# Patient Record
Sex: FEMALE | Race: WHITE | Employment: FULL TIME | URBAN - METROPOLITAN AREA
[De-identification: names, ages, dates, MRNs, and addresses within clinical notes are randomized per-mention and may not be internally consistent; named-entity substitution may affect disease eponyms.]

---

## 2017-07-08 ENCOUNTER — HOSPITAL ENCOUNTER (OUTPATIENT)
Dept: MAMMOGRAPHY | Age: 37
Discharge: HOME | End: 2017-07-08
Attending: OBSTETRICS & GYNECOLOGY | Admitting: OBSTETRICS & GYNECOLOGY

## 2017-07-08 DIAGNOSIS — Z12.31 ENCOUNTER FOR SCREENING MAMMOGRAM FOR BREAST CANCER: ICD-10-CM

## 2018-01-23 ENCOUNTER — HOSPITAL ENCOUNTER (OUTPATIENT)
Dept: OTHER | Age: 38
Discharge: HOME | End: 2018-01-23
Attending: NURSE PRACTITIONER | Admitting: NURSE PRACTITIONER

## 2018-01-23 DIAGNOSIS — R06.09 DYSPNEA ON EXERTION: ICD-10-CM

## 2018-01-23 LAB
AMYLASE: 40 U/L (ref 25–115)
BASOPHILS ABSOLUTE: 0 K/UL (ref 0–0.2)
BASOPHILS RELATIVE PERCENT: 0.7 %
D DIMER: 212 NG/ML DDU (ref 0–229)
EOSINOPHILS ABSOLUTE: 0 K/UL (ref 0–0.6)
EOSINOPHILS RELATIVE PERCENT: 0.8 %
HCT VFR BLD CALC: 39.7 % (ref 36–48)
HEMOGLOBIN: 13.6 G/DL (ref 12–16)
LIPASE: 28 U/L (ref 13–60)
LYMPHOCYTES ABSOLUTE: 1.5 K/UL (ref 1–5.1)
LYMPHOCYTES RELATIVE PERCENT: 26.8 %
MCH RBC QN AUTO: 29.5 PG (ref 26–34)
MCHC RBC AUTO-ENTMCNC: 34.2 G/DL (ref 31–36)
MCV RBC AUTO: 86.3 FL (ref 80–100)
MONOCYTES ABSOLUTE: 0.3 K/UL (ref 0–1.3)
MONOCYTES RELATIVE PERCENT: 6 %
NEUTROPHILS ABSOLUTE: 3.6 K/UL (ref 1.7–7.7)
NEUTROPHILS RELATIVE PERCENT: 65.7 %
PDW BLD-RTO: 13.5 % (ref 12.4–15.4)
PLATELET # BLD: 152 K/UL (ref 135–450)
PMV BLD AUTO: 9.8 FL (ref 5–10.5)
RBC # BLD: 4.6 M/UL (ref 4–5.2)
WBC # BLD: 5.4 K/UL (ref 4–11)

## 2018-07-09 ENCOUNTER — HOSPITAL ENCOUNTER (OUTPATIENT)
Dept: MAMMOGRAPHY | Age: 38
Discharge: HOME | End: 2018-07-09
Attending: OBSTETRICS & GYNECOLOGY | Admitting: OBSTETRICS & GYNECOLOGY

## 2018-07-09 DIAGNOSIS — Z12.31 SCREENING MAMMOGRAM, ENCOUNTER FOR: ICD-10-CM

## 2019-07-15 ENCOUNTER — HOSPITAL ENCOUNTER (OUTPATIENT)
Dept: MAMMOGRAPHY | Age: 39
Discharge: HOME | End: 2019-07-15
Payer: COMMERCIAL

## 2019-07-15 DIAGNOSIS — Z12.31 ENCOUNTER FOR SCREENING MAMMOGRAM FOR BREAST CANCER: ICD-10-CM

## 2019-07-15 PROCEDURE — 77063 BREAST TOMOSYNTHESIS BI: CPT

## 2020-07-16 ENCOUNTER — TELEPHONE (OUTPATIENT)
Dept: MAMMOGRAPHY | Age: 40
End: 2020-07-16

## 2020-07-16 ENCOUNTER — HOSPITAL ENCOUNTER (OUTPATIENT)
Dept: MAMMOGRAPHY | Age: 40
Discharge: HOME | End: 2020-07-16
Payer: COMMERCIAL

## 2020-07-16 PROCEDURE — 77063 BREAST TOMOSYNTHESIS BI: CPT

## 2020-12-11 ENCOUNTER — APPOINTMENT (OUTPATIENT)
Dept: GENERAL RADIOLOGY | Age: 40
End: 2020-12-11
Payer: COMMERCIAL

## 2020-12-11 ENCOUNTER — HOSPITAL ENCOUNTER (EMERGENCY)
Age: 40
Discharge: HOME | End: 2020-12-11
Attending: STUDENT IN AN ORGANIZED HEALTH CARE EDUCATION/TRAINING PROGRAM
Payer: COMMERCIAL

## 2020-12-11 VITALS
DIASTOLIC BLOOD PRESSURE: 84 MMHG | BODY MASS INDEX: 26.78 KG/M2 | SYSTOLIC BLOOD PRESSURE: 140 MMHG | TEMPERATURE: 97.8 F | HEART RATE: 54 BPM | WEIGHT: 156 LBS | RESPIRATION RATE: 18 BRPM | OXYGEN SATURATION: 100 %

## 2020-12-11 LAB
A/G RATIO: 1.8 (ref 1.1–2.2)
ALBUMIN SERPL-MCNC: 4.8 G/DL (ref 3.4–5)
ALP BLD-CCNC: 78 U/L (ref 40–129)
ALT SERPL-CCNC: 24 U/L (ref 10–40)
ANION GAP SERPL CALCULATED.3IONS-SCNC: 10 MMOL/L (ref 3–16)
AST SERPL-CCNC: 17 U/L (ref 15–37)
BASOPHILS ABSOLUTE: 0 K/UL (ref 0–0.2)
BASOPHILS RELATIVE PERCENT: 0.4 %
BILIRUB SERPL-MCNC: 0.4 MG/DL (ref 0–1)
BUN BLDV-MCNC: 11 MG/DL (ref 7–20)
CALCIUM SERPL-MCNC: 9.7 MG/DL (ref 8.3–10.6)
CHLORIDE BLD-SCNC: 104 MMOL/L (ref 99–110)
CO2: 26 MMOL/L (ref 21–32)
CREAT SERPL-MCNC: 0.7 MG/DL (ref 0.6–1.1)
D DIMER: 228 NG/ML DDU (ref 0–229)
EKG ATRIAL RATE: 52 BPM
EKG DIAGNOSIS: NORMAL
EKG P AXIS: 60 DEGREES
EKG P-R INTERVAL: 148 MS
EKG Q-T INTERVAL: 468 MS
EKG QRS DURATION: 98 MS
EKG QTC CALCULATION (BAZETT): 435 MS
EKG R AXIS: 73 DEGREES
EKG T AXIS: 68 DEGREES
EKG VENTRICULAR RATE: 52 BPM
EOSINOPHILS ABSOLUTE: 0.1 K/UL (ref 0–0.6)
EOSINOPHILS RELATIVE PERCENT: 1.5 %
GFR AFRICAN AMERICAN: >60
GFR NON-AFRICAN AMERICAN: >60
GLOBULIN: 2.7 G/DL
GLUCOSE BLD-MCNC: 100 MG/DL (ref 70–99)
HCT VFR BLD CALC: 42.1 % (ref 36–48)
HEMOGLOBIN: 14.4 G/DL (ref 12–16)
LYMPHOCYTES ABSOLUTE: 1.5 K/UL (ref 1–5.1)
LYMPHOCYTES RELATIVE PERCENT: 27.5 %
MCH RBC QN AUTO: 29.3 PG (ref 26–34)
MCHC RBC AUTO-ENTMCNC: 34.2 G/DL (ref 31–36)
MCV RBC AUTO: 85.5 FL (ref 80–100)
MONOCYTES ABSOLUTE: 0.5 K/UL (ref 0–1.3)
MONOCYTES RELATIVE PERCENT: 9 %
NEUTROPHILS ABSOLUTE: 3.4 K/UL (ref 1.7–7.7)
NEUTROPHILS RELATIVE PERCENT: 61.6 %
PDW BLD-RTO: 13 % (ref 12.4–15.4)
PLATELET # BLD: 199 K/UL (ref 135–450)
PMV BLD AUTO: 9.3 FL (ref 5–10.5)
POTASSIUM REFLEX MAGNESIUM: 3.8 MMOL/L (ref 3.5–5.1)
RBC # BLD: 4.92 M/UL (ref 4–5.2)
SODIUM BLD-SCNC: 140 MMOL/L (ref 136–145)
TOTAL PROTEIN: 7.5 G/DL (ref 6.4–8.2)
TROPONIN: <0.01 NG/ML
WBC # BLD: 5.5 K/UL (ref 4–11)

## 2020-12-11 PROCEDURE — 84484 ASSAY OF TROPONIN QUANT: CPT

## 2020-12-11 PROCEDURE — 93010 ELECTROCARDIOGRAM REPORT: CPT | Performed by: INTERNAL MEDICINE

## 2020-12-11 PROCEDURE — 71045 X-RAY EXAM CHEST 1 VIEW: CPT

## 2020-12-11 PROCEDURE — 36415 COLL VENOUS BLD VENIPUNCTURE: CPT

## 2020-12-11 PROCEDURE — 2580000003 HC RX 258: Performed by: STUDENT IN AN ORGANIZED HEALTH CARE EDUCATION/TRAINING PROGRAM

## 2020-12-11 PROCEDURE — 80053 COMPREHEN METABOLIC PANEL: CPT

## 2020-12-11 PROCEDURE — 85025 COMPLETE CBC W/AUTO DIFF WBC: CPT

## 2020-12-11 PROCEDURE — 99285 EMERGENCY DEPT VISIT HI MDM: CPT

## 2020-12-11 PROCEDURE — 93005 ELECTROCARDIOGRAM TRACING: CPT | Performed by: STUDENT IN AN ORGANIZED HEALTH CARE EDUCATION/TRAINING PROGRAM

## 2020-12-11 PROCEDURE — 85379 FIBRIN DEGRADATION QUANT: CPT

## 2020-12-11 RX ORDER — 0.9 % SODIUM CHLORIDE 0.9 %
1000 INTRAVENOUS SOLUTION INTRAVENOUS ONCE
Status: COMPLETED | OUTPATIENT
Start: 2020-12-11 | End: 2020-12-11

## 2020-12-11 RX ADMIN — SODIUM CHLORIDE 1000 ML: 9 INJECTION, SOLUTION INTRAVENOUS at 16:08

## 2020-12-11 ASSESSMENT — ENCOUNTER SYMPTOMS
NAUSEA: 0
PHOTOPHOBIA: 0
STRIDOR: 0
COUGH: 1
VOMITING: 0
ABDOMINAL PAIN: 0
BACK PAIN: 0
CHEST TIGHTNESS: 1
SORE THROAT: 0
RHINORRHEA: 0
SHORTNESS OF BREATH: 1

## 2020-12-11 ASSESSMENT — PAIN DESCRIPTION - DESCRIPTORS: DESCRIPTORS: ACHING

## 2020-12-11 ASSESSMENT — PAIN SCALES - GENERAL: PAINLEVEL_OUTOF10: 2

## 2020-12-11 ASSESSMENT — PAIN DESCRIPTION - LOCATION: LOCATION: CHEST

## 2020-12-11 NOTE — ED NOTES
Pt states she felt she was improving. States she was dx with covid last week. States some chest pain and discomfort with deep breathing.       Margaret Saavedra RN  12/11/20 [id number]

## 2020-12-11 NOTE — ED PROVIDER NOTES
[facility]      Pt Name: Segundo Patel  MRN: [id number]  [place] 1980  Date of evaluation: 12/11/2020  Provider: Flip Mcdonald, [address] [place] [address]       Chief Complaint   Patient presents with    Dizziness     covid+ , sob dizzy today          HISTORY OF PRESENT ILLNESS   (Location/Symptom, Timing/Onset, Context/Setting, Quality, Duration, Modifying Factors, Severity)  Note limiting factors. Segundo Patel is a 41 y.o. female who presents to the emergency department complaining of shortness of breath, dizziness, chest tightness. Patient known Covid infection, has been symptomatic for roughly 1-1/2 weeks. Patient called EMS to home today due to feeling as if she was going to pass out describing lightheadedness. Does complain of some left-sided chest tightness which does not radiate. Denies cardiac history. Denies lower extremity asymmetry or posterior calf pain. Of note patient does have prior history of DVT 15 years ago following a pregnancy. Not currently on anticoagulation aside from 81 mg aspirin daily. Patient is not tachycardic or tachypneic on arrival, patient is satting 99% on room air on arrival by EMS. This patient was evaluated in the Emergency Department for symptoms described in the history of present illness. He/she was evaluated in the context of the global COVID-19 pandemic, which necessitated consideration that the patient might be at risk for infection with the SARS-CoV-2 virus that causes COVID-19. Institutional protocols and algorithms that pertain to the evaluation of patients at risk for COVID-19 are in a state of rapid change based on information released by regulatory bodies including the CDC and federal and state organizations. These policies and algorithms were followed during the patient's care in the ED.     Patient was seen and evaluated with N95, eye protection, gloves        Nursing Notes were reviewed. PAST MEDICAL HISTORY     Past Medical History:   Diagnosis Date    DVT (deep venous thrombosis) ([facility] [address] 2005    RLE (2 concurrent clots); post-partum; was on Lovenox for 3 mo.  History of blood clots          SURGICAL HISTORY       Past Surgical History:   Procedure Laterality Date    NASAL ENDOSCOPY  12/23/13    nasal endoscopy, removal of foreign body    TONSILLECTOMY      WRIST SURGERY  1995    plate put in         CURRENT MEDICATIONS       Previous Medications    ASPIRIN 81 MG CHEWABLE TABLET    Take 81 mg by mouth daily. IBUPROFEN (IBU) 600 MG TABLET    Take 1 tablet by mouth every 6 hours as needed for Pain. ALLERGIES     Patient has no known allergies.     FAMILY HISTORY       Family History   Problem Relation Age of Onset    Cancer Maternal Grandmother [de-identified]        lung CA spread to breast    Heart Disease Maternal Grandfather 58        CHF    Cancer Paternal Grandfather         prostate    Alzheimer's Disease Paternal Grandfather     Mult Sclerosis Mother     Heart Disease Paternal Grandmother           SOCIAL HISTORY       Social History     Socioeconomic History    Marital status:      Spouse name: None    Number of children: None    Years of education: None    Highest education level: None   Occupational History    None   Social Needs    Financial resource strain: None    Food insecurity     Worry: None     Inability: None    Transportation needs     Medical: None     Non-medical: None   Tobacco Use    Smoking status: Never Smoker    Smokeless tobacco: Never Used   Substance and Sexual Activity    Alcohol use: No    Drug use: No    Sexual activity: Yes     Partners: Male   Lifestyle    Physical activity     Days per week: None     Minutes per session: None    Stress: None   Relationships    Social connections     Talks on phone: None     Gets together: None     Attends Caodaism service: None     Active member of club or organization: None     Attends meetings of clubs or organizations: None     Relationship status: None    Intimate partner violence     Fear of current or ex partner: None     Emotionally abused: None     Physically abused: None     Forced sexual activity: None   Other Topics Concern    None   Social History Narrative    None       SCREENINGS        Tamiko Coma Scale  Eye Opening: Spontaneous  Best Verbal Response: Oriented  Best Motor Response: Obeys commands  Tamiko Coma Scale Score: 15                   REVIEW OF SYSTEMS    (2-9 systems for level 4, 10 or more for level 5)   Review of Systems   Constitutional: Positive for chills, fatigue and fever. HENT: Negative for congestion, rhinorrhea and sore throat. Eyes: Negative for photophobia and visual disturbance. Respiratory: Positive for cough, chest tightness and shortness of breath. Negative for stridor. Cardiovascular: Positive for chest pain. Negative for palpitations and leg swelling. Gastrointestinal: Negative for abdominal pain, nausea and vomiting. Genitourinary: Negative for decreased urine volume. Musculoskeletal: Positive for myalgias. Negative for back pain, neck pain and neck stiffness. Skin: Negative for rash. Allergic/Immunologic: Negative for environmental allergies. Hematological: Negative for adenopathy. Psychiatric/Behavioral: Negative for confusion. PHYSICAL EXAM    (up to 7 for level 4, 8 or more for level 5)     ED Triage Vitals   BP Temp Temp src Pulse Resp SpO2 Height Weight   -- -- -- -- -- -- -- --       Physical Exam  Constitutional:       General: She is not in acute distress. Appearance: She is not diaphoretic. HENT:      Head: Normocephalic and atraumatic. Eyes:      Pupils: Pupils are equal, round, and reactive to light. Neck:      Musculoskeletal: Normal range of motion and neck supple. Trachea: No tracheal deviation. Cardiovascular:      Rate and Rhythm: Regular rhythm. Bradycardia present.    Pulmonary: Effort: Pulmonary effort is normal. No respiratory distress. Abdominal:      General: There is no distension. Palpations: Abdomen is soft. Musculoskeletal: Normal range of motion. Right lower leg: No edema. Left lower leg: No edema. Skin:     General: Skin is warm. Neurological:      Mental Status: She is oriented to person, place, and time. DIAGNOSTIC RESULTS     EKG: All EKG's are interpreted by the Emergency Department Physician who either signs or Co-signs this chart in the absence of a cardiologist.      The Ekg interpreted by me shows  normal sinus rhythm with a rate of 52  Axis is   Normal  QTc is  normal  Intervals and Durations are unremarkable. ST Segments: nonspecific changes    No significant change from prior EKG dated none        RADIOLOGY:   Non-plain film images such as CT, Ultrasound and MRI are read by the radiologist. Plain radiographic images are visualized and preliminarily interpreted by the emergency physician. Interpretation per the Radiologist below, if available at the time of this note:    XR CHEST PORTABLE   Final Result   No evidence of acute cardiopulmonary disease. LABS:  Labs Reviewed   COMPREHENSIVE METABOLIC PANEL W/ REFLEX TO MG FOR LOW K - Abnormal; Notable for the following components:       Result Value    Glucose 100 (*)     All other components within normal limits    Narrative:     Performed at:  [facility]. [facility]  [address] [place] [address]. [place] [address] [place]    Phone [phone]   CBC WITH AUTO DIFFERENTIAL    Narrative:     Performed at:  [facility]. [facility]  [address] [place] [address]. [place] [address] [place]    Phone [phone]   TROPONIN    Narrative:     Performed at:  [facility]. [facility]  [address] [place] [address].  [address]   Phone [phone]   D-DIMER, QUANTITATIVE    Narrative:     Performed at:  [id number] [facility] [address] [facility]  [address] [place], [place]. [facility], [address]   Phone [phone]       All other labs were within normal range or not returned as of this dictation. EMERGENCY DEPARTMENT COURSE and DIFFERENTIAL DIAGNOSIS/MDM:   Jamarcus Bustamante is a 41 y.o. female who presents to the emergency department with the complaint of fatigue, lightheadedness dizziness, near syncope, chest tightness. History of Covid symptomatic for last 1/2 weeks. On arrival patient's vital signs are stable she is not tachycardic tachypneic or hypoxic with SPO2 of 90% on room air. Lung sounds are clear bilaterally abdomen soft nontender. Heart slightly bradycardic but otherwise regular without murmurs rubs or gallops. History of DVT 15 years ago, clinically no signs of DVT on exam.  Due to patient's shortness of breath complaint, near syncope complaining with chest tightness in the setting of Covid infection, which puts her at high risk for clotting, obtain D-dimer, basic labs, troponin, EKG, portable chest x-ray. Patient work-up reviewed prior to discharge patient's troponin less than 0.01, D-dimer low, no signs of ischemia on EKG. Chest x-ray without signs of bacterial pneumonia. Otherwise patient's lab work is stable. Patient has known Covid infection likely explaining her symptoms. Will discharge home. Patient was given both written and verbal return precautions for coronavirus infection including monitoring of SPO2, patient instructed to return to ER if SPO2 remains below 90 at rest.             CRITICAL CARE TIME   Total Critical Care time was 0 minutes, excluding separately reportable procedures. There was a high probability of clinically significant/life threatening deterioration in the patient's condition which required my urgent intervention.      Clinical concern   Intervention     CONSULTS:  None    PROCEDURES:  Unless otherwise noted below, none     Procedures        FINAL

## 2021-02-03 ENCOUNTER — HOSPITAL ENCOUNTER (OUTPATIENT)
Age: 41
Discharge: HOME | End: 2021-02-03
Payer: COMMERCIAL

## 2021-02-03 ENCOUNTER — HOSPITAL ENCOUNTER (OUTPATIENT)
Dept: GENERAL RADIOLOGY | Age: 41
Discharge: HOME | End: 2021-02-03
Payer: COMMERCIAL

## 2021-02-03 DIAGNOSIS — R06.00 DYSPNEA, UNSPECIFIED TYPE: ICD-10-CM

## 2021-02-03 PROCEDURE — 71046 X-RAY EXAM CHEST 2 VIEWS: CPT

## 2021-07-26 ENCOUNTER — HOSPITAL ENCOUNTER (OUTPATIENT)
Dept: MAMMOGRAPHY | Age: 41
Discharge: HOME | End: 2021-07-26
Payer: COMMERCIAL

## 2021-07-26 DIAGNOSIS — Z12.31 SCREENING MAMMOGRAM, ENCOUNTER FOR: ICD-10-CM

## 2021-07-26 PROCEDURE — 77063 BREAST TOMOSYNTHESIS BI: CPT

## 2021-07-27 ENCOUNTER — TELEPHONE (OUTPATIENT)
Dept: MAMMOGRAPHY | Age: 41
End: 2021-07-27

## 2021-10-14 ENCOUNTER — APPOINTMENT (OUTPATIENT)
Dept: CT IMAGING | Age: 41
End: 2021-10-14
Payer: COMMERCIAL

## 2021-10-14 ENCOUNTER — HOSPITAL ENCOUNTER (EMERGENCY)
Age: 41
Discharge: HOME | End: 2021-10-14
Attending: STUDENT IN AN ORGANIZED HEALTH CARE EDUCATION/TRAINING PROGRAM
Payer: COMMERCIAL

## 2021-10-14 ENCOUNTER — APPOINTMENT (OUTPATIENT)
Dept: GENERAL RADIOLOGY | Age: 41
End: 2021-10-14
Payer: COMMERCIAL

## 2021-10-14 VITALS
HEART RATE: 54 BPM | DIASTOLIC BLOOD PRESSURE: 77 MMHG | SYSTOLIC BLOOD PRESSURE: 113 MMHG | RESPIRATION RATE: 14 BRPM | TEMPERATURE: 97.2 F | OXYGEN SATURATION: 99 %

## 2021-10-14 DIAGNOSIS — R07.89 ATYPICAL CHEST PAIN: Primary | ICD-10-CM

## 2021-10-14 LAB
A/G RATIO: 1.7 (ref 1.1–2.2)
ALBUMIN SERPL-MCNC: 4.4 G/DL (ref 3.4–5)
ALP BLD-CCNC: 70 U/L (ref 40–129)
ALT SERPL-CCNC: 14 U/L (ref 10–40)
ANION GAP SERPL CALCULATED.3IONS-SCNC: 10 MMOL/L (ref 3–16)
AST SERPL-CCNC: 15 U/L (ref 15–37)
BASOPHILS ABSOLUTE: 0 K/UL (ref 0–0.2)
BASOPHILS RELATIVE PERCENT: 0.6 %
BILIRUB SERPL-MCNC: 0.3 MG/DL (ref 0–1)
BUN BLDV-MCNC: 10 MG/DL (ref 7–20)
CALCIUM SERPL-MCNC: 9.4 MG/DL (ref 8.3–10.6)
CHLORIDE BLD-SCNC: 103 MMOL/L (ref 99–110)
CO2: 25 MMOL/L (ref 21–32)
CREAT SERPL-MCNC: 0.7 MG/DL (ref 0.6–1.1)
D DIMER: 260 NG/ML DDU (ref 0–229)
EKG ATRIAL RATE: 51 BPM
EKG DIAGNOSIS: NORMAL
EKG P AXIS: 55 DEGREES
EKG P-R INTERVAL: 146 MS
EKG Q-T INTERVAL: 440 MS
EKG QRS DURATION: 104 MS
EKG QTC CALCULATION (BAZETT): 405 MS
EKG R AXIS: 60 DEGREES
EKG T AXIS: 47 DEGREES
EKG VENTRICULAR RATE: 51 BPM
EOSINOPHILS ABSOLUTE: 0.1 K/UL (ref 0–0.6)
EOSINOPHILS RELATIVE PERCENT: 2.8 %
GFR AFRICAN AMERICAN: >60
GFR NON-AFRICAN AMERICAN: >60
GLOBULIN: 2.6 G/DL
GLUCOSE BLD-MCNC: 94 MG/DL (ref 70–99)
HCT VFR BLD CALC: 38.6 % (ref 36–48)
HEMOGLOBIN: 13.2 G/DL (ref 12–16)
LYMPHOCYTES ABSOLUTE: 2.1 K/UL (ref 1–5.1)
LYMPHOCYTES RELATIVE PERCENT: 42.1 %
MCH RBC QN AUTO: 29.3 PG (ref 26–34)
MCHC RBC AUTO-ENTMCNC: 34.2 G/DL (ref 31–36)
MCV RBC AUTO: 85.5 FL (ref 80–100)
MONOCYTES ABSOLUTE: 0.5 K/UL (ref 0–1.3)
MONOCYTES RELATIVE PERCENT: 9.1 %
NEUTROPHILS ABSOLUTE: 2.3 K/UL (ref 1.7–7.7)
NEUTROPHILS RELATIVE PERCENT: 45.4 %
PDW BLD-RTO: 13.6 % (ref 12.4–15.4)
PLATELET # BLD: 171 K/UL (ref 135–450)
PMV BLD AUTO: 9.3 FL (ref 5–10.5)
POTASSIUM REFLEX MAGNESIUM: 3.9 MMOL/L (ref 3.5–5.1)
RBC # BLD: 4.51 M/UL (ref 4–5.2)
SODIUM BLD-SCNC: 138 MMOL/L (ref 136–145)
TOTAL PROTEIN: 7 G/DL (ref 6.4–8.2)
TROPONIN: <0.01 NG/ML
TROPONIN: <0.01 NG/ML
WBC # BLD: 5 K/UL (ref 4–11)

## 2021-10-14 PROCEDURE — 6370000000 HC RX 637 (ALT 250 FOR IP): Performed by: STUDENT IN AN ORGANIZED HEALTH CARE EDUCATION/TRAINING PROGRAM

## 2021-10-14 PROCEDURE — 85025 COMPLETE CBC W/AUTO DIFF WBC: CPT

## 2021-10-14 PROCEDURE — 93005 ELECTROCARDIOGRAM TRACING: CPT | Performed by: STUDENT IN AN ORGANIZED HEALTH CARE EDUCATION/TRAINING PROGRAM

## 2021-10-14 PROCEDURE — 71260 CT THORAX DX C+: CPT

## 2021-10-14 PROCEDURE — 80053 COMPREHEN METABOLIC PANEL: CPT

## 2021-10-14 PROCEDURE — 84484 ASSAY OF TROPONIN QUANT: CPT

## 2021-10-14 PROCEDURE — 85379 FIBRIN DEGRADATION QUANT: CPT

## 2021-10-14 PROCEDURE — 71046 X-RAY EXAM CHEST 2 VIEWS: CPT

## 2021-10-14 PROCEDURE — 93010 ELECTROCARDIOGRAM REPORT: CPT | Performed by: INTERNAL MEDICINE

## 2021-10-14 PROCEDURE — 99282 EMERGENCY DEPT VISIT SF MDM: CPT

## 2021-10-14 PROCEDURE — 6360000004 HC RX CONTRAST MEDICATION: Performed by: STUDENT IN AN ORGANIZED HEALTH CARE EDUCATION/TRAINING PROGRAM

## 2021-10-14 RX ORDER — ASPIRIN 81 MG/1
324 TABLET, CHEWABLE ORAL ONCE
Status: COMPLETED | OUTPATIENT
Start: 2021-10-14 | End: 2021-10-14

## 2021-10-14 RX ADMIN — IOPAMIDOL 85 ML: 755 INJECTION, SOLUTION INTRAVENOUS at 06:40

## 2021-10-14 RX ADMIN — ASPIRIN 324 MG: 81 TABLET, CHEWABLE ORAL at 04:39

## 2021-10-14 ASSESSMENT — ENCOUNTER SYMPTOMS
PHOTOPHOBIA: 0
BACK PAIN: 0
VOMITING: 0
ABDOMINAL PAIN: 0
COUGH: 0
STRIDOR: 0
NAUSEA: 0
SHORTNESS OF BREATH: 1
RHINORRHEA: 0
SORE THROAT: 0

## 2021-10-14 ASSESSMENT — PAIN DESCRIPTION - PAIN TYPE: TYPE: ACUTE PAIN

## 2021-10-14 ASSESSMENT — PAIN DESCRIPTION - LOCATION: LOCATION: CHEST

## 2021-10-14 ASSESSMENT — PAIN DESCRIPTION - DESCRIPTORS: DESCRIPTORS: SHARP

## 2021-10-14 ASSESSMENT — HEART SCORE: ECG: 1

## 2021-10-14 NOTE — ED NOTES
Pt stable for DC to home with family. Reviewed DC instructions, follow up, meds; pt and family verbalized understanding and pt ambulated off unit without trouble.       Harman Matthews RN  10/14/21 [id number]

## 2021-10-14 NOTE — ED PROVIDER NOTES
[address] ED  EMERGENCY DEPARTMENT ENCOUNTER      Pt Name: Hannah Saxena  MRN: [id number]  Armstrongfaimee 1980  Date of evaluation: 10/14/2021  Provider: Griffin Porras [address] [place] [address]       Chief Complaint   Patient presents with    Chest Pain     Patient was awoken from sleep with sharp chest pain, states it is hard for her to catch her breath. History of DVTs         HISTORY OF PRESENT ILLNESS   (Location/Symptom, Timing/Onset, Context/Setting, Quality, Duration, Modifying Factors, Severity)  Note limiting factors. Hannah Saxena is a 35 y.o. female who presents to the emergency department complaining of chest pain, right-sided just lateral to body the sternum. Woke her from sleep, onset couple hours prior to arrival.  Does have a history of DVTs during prior pregnancy no history of PE not on anticoagulation. Does take 81 mg aspirin daily. Notes report of lower extremity symmetry posterior calf pain. Patient does report that she got her second [address] shot on Saturday since that time has had body aches mild shortness of breath however the chest pain this evening is new. No cardiac history no history of early cardiac disease in family. Patient was seen the past December for chest pain and lightheadedness her cardiac work-up at that time was unremarkable and she was discharged home. Patient now is complaining of nonradiating right-sided chest pain described as sharp, worsens with palpation no alleviating factors. Does have associated mild shortness of breath which has been ongoing since her second Covid shot. No associated nausea vomiting or diaphoresis. Nursing Notes were reviewed. PAST MEDICAL HISTORY     Past Medical History:   Diagnosis Date    DVT (deep venous thrombosis) ([address] 2005    RLE (2 concurrent clots); post-partum; was on Lovenox for 3 mo.     History of blood clots          SURGICAL HISTORY       Past Surgical History:   Procedure Laterality Date  NASAL ENDOSCOPY  12/23/13    nasal endoscopy, removal of foreign body    TONSILLECTOMY      WRIST SURGERY  1995    plate put in         CURRENT MEDICATIONS       Previous Medications    ASPIRIN 81 MG CHEWABLE TABLET    Take 81 mg by mouth daily. IBUPROFEN (IBU) 600 MG TABLET    Take 1 tablet by mouth every 6 hours as needed for Pain. ALLERGIES     Patient has no known allergies. FAMILY HISTORY       Family History   Problem Relation Age of Onset    Cancer Maternal Grandmother [de-identified]        lung CA spread to breast    Heart Disease Maternal Grandfather 58        CHF    Cancer Paternal Grandfather         prostate    Alzheimer's Disease Paternal Grandfather     Mult Sclerosis Mother     Breast Cancer Mother 54    Heart Disease Paternal Grandmother           SOCIAL HISTORY       Social History     Socioeconomic History    Marital status:      Spouse name: Not on file    Number of children: Not on file    Years of education: Not on file    Highest education level: Not on file   Occupational History    Not on file   Tobacco Use    Smoking status: Never Smoker    Smokeless tobacco: Never Used   Substance and Sexual Activity    Alcohol use: No    Drug use: No    Sexual activity: Yes     Partners: Male   Other Topics Concern    Not on file   Social History Narrative    Not on file     Social Determinants of Health     Financial Resource Strain:     Difficulty of Paying Living Expenses:    Food Insecurity:     Worried About Running Out of Food in the Last Year:     Ran Out of Food in the Last Year:    Transportation Needs:     Lack of Transportation (Medical):      Lack of Transportation (Non-Medical):    Physical Activity:     Days of Exercise per Week:     Minutes of Exercise per Session:    Stress:     Feeling of Stress :    Social Connections:     Frequency of Communication with Friends and Family:     Frequency of Social Gatherings with Friends and Family:     Attends Restorationism Services:     Active Member of Clubs or Organizations:     Attends Club or Organization Meetings:     Marital Status:    Intimate Partner Violence:     Fear of Current or Ex-Partner:     Emotionally Abused:     Physically Abused:     Sexually Abused:        SCREENINGS                            REVIEW OF SYSTEMS    (2-9 systems for level 4, 10 or more for level 5)   Review of Systems   Constitutional: Positive for fatigue. Negative for chills and fever. HENT: Negative for congestion, rhinorrhea and sore throat. Eyes: Negative for photophobia and visual disturbance. Respiratory: Positive for shortness of breath. Negative for cough and stridor. Cardiovascular: Positive for chest pain. Negative for palpitations and leg swelling. Gastrointestinal: Negative for abdominal pain, nausea and vomiting. Genitourinary: Negative. Musculoskeletal: Negative for back pain, neck pain and neck stiffness. Skin: Negative for rash. Hematological: Negative for adenopathy. Psychiatric/Behavioral: Negative for confusion. PHYSICAL EXAM    (up to 7 for level 4, 8 or more for level 5)   RECENT VITALS:     Temp: 97.2 °F (36.2 °C),  Pulse: 63, Resp: 14, BP: 131/81, SpO2: 100 %    Physical Exam  Constitutional:       General: She is not in acute distress. Appearance: She is not diaphoretic. HENT:      Head: Normocephalic and atraumatic. Eyes:      Pupils: Pupils are equal, round, and reactive to light. Neck:      Trachea: No tracheal deviation. Cardiovascular:      Rate and Rhythm: Regular rhythm. Bradycardia present. Pulmonary:      Effort: Pulmonary effort is normal. No respiratory distress. Comments: Patient has reproducible right sided costochondral pain to palpation. Abdominal:      General: There is no distension. Palpations: Abdomen is soft. Musculoskeletal:         General: Normal range of motion. Cervical back: Normal range of motion and neck supple. Skin:     General: Skin is warm. Neurological:      Mental Status: She is oriented to person, place, and time. DIAGNOSTIC RESULTS     EKG: All EKG's are interpreted by the Emergency Department Physician who either signs or Co-signs this chart in the absence of a cardiologist.      The Ekg interpreted by me shows  sinus bradycardia, rate=51   Axis is   Normal  QTc is  normal  Intervals and Durations are unremarkable. ST Segments: no acute change    No significant change from prior EKG dated 12/2020        RADIOLOGY:   Non-plain film images such as CT, Ultrasound and MRI are read by the radiologist. Plain radiographic images are visualized and preliminarily interpreted by the emergency physician. Interpretation per the Radiologist below, if available at the time of this note:    XR CHEST (2 VW)   Final Result   No acute disease. CT CHEST PULMONARY EMBOLISM W CONTRAST    (Results Pending)         LABS:  Labs Reviewed   D-DIMER, QUANTITATIVE - Abnormal; Notable for the following components:       Result Value    D-Dimer, Quant 260 (*)     All other components within normal limits    Narrative:     Performed at:  [facility],  [place], [facility]   Phone [phone]   TROPONIN    Narrative:     Performed at:  [facility],  [place], [facility]   Phone [phone]   CBC WITH AUTO DIFFERENTIAL    Narrative:     Performed at:  [address] [facility],  [place], [facility]   Phone [phone]   COMPREHENSIVE METABOLIC PANEL W/ REFLEX TO MG FOR LOW K    Narrative:     Performed at:  [facility],  [place], [facility]   Phone [phone]   TROPONIN       All other labs were within normal range or not returned as of this dictation.     EMERGENCY DEPARTMENT COURSE and DIFFERENTIAL DIAGNOSIS/MDM: Marv Pavon is a 35 y.o. female who presents to the emergency department with the complaint of right-sided chest pain. On physical exam chest pain appears to be reproducible along the right costochondral margin. Sharp nonradiating. Has had mild shortness of breath since her second Covid shots past Saturday. No fevers no cough. Satting 100% on room air. She is bradycardic prior history of same EKG without significant change from prior. Atypical chest pain. No cardiac history. Heart score less than 3. Due to DVT history, will screen with D-dimer however low suspicion based on no signs of DVT on exam she is not tachycardic tachypneic or hypoxic. Will give full dose aspirin now. Patient's initial troponin less than 0.01. EKG does not show any significant change from prior check, sinus bradycardia which she has been. Otherwise hemodynamically stable. Will obtain a 3-hour troponin. Patient received full dose aspirin. Otherwise patient's lab work is unremarkable excluding mild elevated D-dimer due to prior history of DVT, clinically no signs of large DVT on physical exam however due to chest pain and shortness of breath complaint CT PE study was ordered. Heart Score:   Heart Score for chest pain patients  History: Slightly Suspicious  ECG: Non-Specifc repolarization disturbance/LBTB/PM  Patient Age: < 45 years  Risk Factors: 1 or 2 risk factors  Troponin: < 1X normal limit  Heart Score Total: 2    Score 0-3: 2.5% MACE over next 6 weeks = Discharge Home  Score 4-6: 20.3% MACE over next 6 weeks = Admit for Clinical Observation  Score 7-10: 72.7% MACE over next 6 weeks = Early Invasive Strategies   Chest Pain in the Emergency Room: A Multicenter Validation of the [address] Iain BE, Lee AJ, Salbador FREDI, Mast TP, [employer], Mast EG, Elissa SH, [facility] [place] [facility]. Crit Pathw Cardiol. 2010 Sep; 9(3): 164-169.   \"A prospective validation of the HEART score for chest pain patients at the emergency department. \" Int J Cardiol. 2013 Oct 3;168(3):2153-8. doi: 10.1016/j.ijcard. 2013.01.255. Epub 2013 Mar 7. Repeat troponin less than 0.01, CT PE negative for pulmonary mass or other cardiopulmonary findings imaging. Patient with low risk heart score. Patient given PCP for follow-up.   Patient symptoms very typical in nature suspect musculoskeletal in origin given typical return precautions for chest pain    Results for orders placed or performed during the hospital encounter of 10/14/21   D-dimer, quantitative   Result Value Ref Range    D-Dimer, Quant 260 (H) 0 - 229 ng/mL DDU   Troponin   Result Value Ref Range    Troponin <0.01 <0.01 ng/mL   CBC auto differential   Result Value Ref Range    WBC 5.0 4.0 - 11.0 K/uL    RBC 4.51 4.00 - 5.20 M/uL    Hemoglobin 13.2 12.0 - 16.0 g/dL    Hematocrit 38.6 36.0 - 48.0 %    MCV 85.5 80.0 - 100.0 fL    MCH 29.3 26.0 - 34.0 pg    MCHC 34.2 31.0 - 36.0 g/dL    RDW 13.6 12.4 - 15.4 %    Platelets 086 311 - 977 K/uL    MPV 9.3 5.0 - 10.5 fL    Neutrophils % 45.4 %    Lymphocytes % 42.1 %    Monocytes % 9.1 %    Eosinophils % 2.8 %    Basophils % 0.6 %    Neutrophils Absolute 2.3 1.7 - 7.7 K/uL    Lymphocytes Absolute 2.1 1.0 - 5.1 K/uL    Monocytes Absolute 0.5 0.0 - 1.3 K/uL    Eosinophils Absolute 0.1 0.0 - 0.6 K/uL    Basophils Absolute 0.0 0.0 - 0.2 K/uL   Comprehensive Metabolic Panel w/ Reflex to MG   Result Value Ref Range    Sodium 138 136 - 145 mmol/L    Potassium reflex Magnesium 3.9 3.5 - 5.1 mmol/L    Chloride 103 99 - 110 mmol/L    CO2 25 21 - 32 mmol/L    Anion Gap 10 3 - 16    Glucose 94 70 - 99 mg/dL    BUN 10 7 - 20 mg/dL    CREATININE 0.7 0.6 - 1.1 mg/dL    GFR Non-African American >60 >60    GFR African American >60 >60    Calcium 9.4 8.3 - 10.6 mg/dL    Total Protein 7.0 6.4 - 8.2 g/dL    Albumin 4.4 3.4 - 5.0 g/dL    Albumin/Globulin Ratio 1.7 1.1 - 2.2    Total Bilirubin 0.3 0.0 - 1.0 mg/dL    Alkaline Phosphatase 70 40 - 129 U/L ALT 14 10 - 40 U/L    AST 15 15 - 37 U/L    Globulin 2.6 Not Established g/dL   Troponin   Result Value Ref Range    Troponin <0.01 <0.01 ng/mL   EKG 12 Lead   Result Value Ref Range    Ventricular Rate 51 BPM    Atrial Rate 51 BPM    P-R Interval 146 ms    QRS Duration 104 ms    Q-T Interval 440 ms    QTc Calculation (Bazett) 405 ms    P Axis 55 degrees    R Axis 60 degrees    T Axis 47 degrees    Diagnosis       Sinus bradycardiaIncomplete right bundle branch blockBorderline ECGNo previous ECGs available       I estimate there is LOW risk for PULMONARY EMBOLISM, ACUTE CORONARY SYNDROME, OR THORACIC AORTIC DISSECTION, thus I consider the discharge disposition reasonable. Shae Shine and I have discussed the diagnosis and risks, and we agree with discharging home to follow-up with their primary doctor. We also discussed returning to the Emergency Department immediately if new or worsening symptoms occur. We have discussed the symptoms which are most concerning (e.g., bloody sputum, fever, worsening pain or shortness of breath, vomiting) that necessitate immediate return. FINAL Impression    1. Atypical chest pain        Blood pressure 131/81, pulse 63, temperature 97.2 °F (36.2 °C), temperature source Temporal, resp. rate 14, SpO2 100 %, not currently breastfeeding. CRITICAL CARE TIME   Total Critical Care time was 0 minutes, excluding separately reportable procedures. There was a high probability of clinically significant/life threatening deterioration in the patient's condition which required my urgent intervention. Clinical concern   Intervention     CONSULTS:  None    PROCEDURES:  Unless otherwise noted below, none     Procedures        FINAL IMPRESSION      1.  Atypical chest pain          DISPOSITION/PLAN   DISPOSITION  dc      PATIENT REFERRED TO:  [facility] [place] [facility]  [address] [facility]  [phone]    If symptoms worsen    [facility]) Pre-Services  [phone]          DISCHARGE MEDICATIONS:  New Prescriptions    No medications on file     Controlled Substances Monitoring:     No flowsheet data found.     (Please note that portions of this note were completed with a voice recognition program.  Efforts were made to edit the dictations but occasionally words are mis-transcribed.)    Bal Vidal DO (electronically signed)  Attending Emergency Physician            Bal Vidal DO  10/14/21 [id number]

## 2022-03-08 ENCOUNTER — APPOINTMENT (OUTPATIENT)
Dept: GENERAL RADIOLOGY | Age: 42
End: 2022-03-08
Payer: COMMERCIAL

## 2022-03-08 ENCOUNTER — HOSPITAL ENCOUNTER (EMERGENCY)
Age: 42
Discharge: HOME | End: 2022-03-08
Attending: STUDENT IN AN ORGANIZED HEALTH CARE EDUCATION/TRAINING PROGRAM
Payer: COMMERCIAL

## 2022-03-08 ENCOUNTER — APPOINTMENT (OUTPATIENT)
Dept: CT IMAGING | Age: 42
End: 2022-03-08
Payer: COMMERCIAL

## 2022-03-08 VITALS
DIASTOLIC BLOOD PRESSURE: 83 MMHG | OXYGEN SATURATION: 100 % | RESPIRATION RATE: 12 BRPM | HEART RATE: 51 BPM | SYSTOLIC BLOOD PRESSURE: 127 MMHG | TEMPERATURE: 98.6 F

## 2022-03-08 DIAGNOSIS — R07.9 CHEST PAIN, UNSPECIFIED TYPE: Primary | ICD-10-CM

## 2022-03-08 DIAGNOSIS — J84.10 LUNG GRANULOMA (HCC): ICD-10-CM

## 2022-03-08 LAB
ANION GAP SERPL CALCULATED.3IONS-SCNC: 10 MMOL/L (ref 3–16)
BASOPHILS ABSOLUTE: 0 K/UL (ref 0–0.2)
BASOPHILS RELATIVE PERCENT: 0.8 %
BUN BLDV-MCNC: 8 MG/DL (ref 7–20)
CALCIUM SERPL-MCNC: 9.4 MG/DL (ref 8.3–10.6)
CHLORIDE BLD-SCNC: 104 MMOL/L (ref 99–110)
CO2: 25 MMOL/L (ref 21–32)
CREAT SERPL-MCNC: 0.7 MG/DL (ref 0.6–1.1)
D DIMER: 235 NG/ML DDU (ref 0–229)
EKG ATRIAL RATE: 55 BPM
EKG DIAGNOSIS: NORMAL
EKG P AXIS: 34 DEGREES
EKG P-R INTERVAL: 146 MS
EKG Q-T INTERVAL: 440 MS
EKG QRS DURATION: 100 MS
EKG QTC CALCULATION (BAZETT): 420 MS
EKG R AXIS: 41 DEGREES
EKG T AXIS: 37 DEGREES
EKG VENTRICULAR RATE: 55 BPM
EOSINOPHILS ABSOLUTE: 0 K/UL (ref 0–0.6)
EOSINOPHILS RELATIVE PERCENT: 0.6 %
GFR AFRICAN AMERICAN: >60
GFR NON-AFRICAN AMERICAN: >60
GLUCOSE BLD-MCNC: 95 MG/DL (ref 70–99)
HCG QUALITATIVE: NEGATIVE
HCT VFR BLD CALC: 36 % (ref 36–48)
HEMOGLOBIN: 11.9 G/DL (ref 12–16)
LYMPHOCYTES ABSOLUTE: 1.1 K/UL (ref 1–5.1)
LYMPHOCYTES RELATIVE PERCENT: 28.2 %
MCH RBC QN AUTO: 27.4 PG (ref 26–34)
MCHC RBC AUTO-ENTMCNC: 33.1 G/DL (ref 31–36)
MCV RBC AUTO: 82.9 FL (ref 80–100)
MONOCYTES ABSOLUTE: 0.3 K/UL (ref 0–1.3)
MONOCYTES RELATIVE PERCENT: 7.1 %
NEUTROPHILS ABSOLUTE: 2.5 K/UL (ref 1.7–7.7)
NEUTROPHILS RELATIVE PERCENT: 63.3 %
PDW BLD-RTO: 13.8 % (ref 12.4–15.4)
PLATELET # BLD: 181 K/UL (ref 135–450)
PMV BLD AUTO: 9.4 FL (ref 5–10.5)
POTASSIUM SERPL-SCNC: 4 MMOL/L (ref 3.5–5.1)
RBC # BLD: 4.34 M/UL (ref 4–5.2)
SODIUM BLD-SCNC: 139 MMOL/L (ref 136–145)
TROPONIN: <0.01 NG/ML
WBC # BLD: 4 K/UL (ref 4–11)

## 2022-03-08 PROCEDURE — 36415 COLL VENOUS BLD VENIPUNCTURE: CPT

## 2022-03-08 PROCEDURE — 6360000004 HC RX CONTRAST MEDICATION: Performed by: STUDENT IN AN ORGANIZED HEALTH CARE EDUCATION/TRAINING PROGRAM

## 2022-03-08 PROCEDURE — 84703 CHORIONIC GONADOTROPIN ASSAY: CPT

## 2022-03-08 PROCEDURE — 85379 FIBRIN DEGRADATION QUANT: CPT

## 2022-03-08 PROCEDURE — 74174 CTA ABD&PLVS W/CONTRAST: CPT

## 2022-03-08 PROCEDURE — 84484 ASSAY OF TROPONIN QUANT: CPT

## 2022-03-08 PROCEDURE — 96360 HYDRATION IV INFUSION INIT: CPT

## 2022-03-08 PROCEDURE — 71046 X-RAY EXAM CHEST 2 VIEWS: CPT

## 2022-03-08 PROCEDURE — 93010 ELECTROCARDIOGRAM REPORT: CPT | Performed by: INTERNAL MEDICINE

## 2022-03-08 PROCEDURE — 85025 COMPLETE CBC W/AUTO DIFF WBC: CPT

## 2022-03-08 PROCEDURE — 80048 BASIC METABOLIC PNL TOTAL CA: CPT

## 2022-03-08 PROCEDURE — 99284 EMERGENCY DEPT VISIT MOD MDM: CPT

## 2022-03-08 PROCEDURE — 2580000003 HC RX 258: Performed by: NURSE PRACTITIONER

## 2022-03-08 PROCEDURE — 93005 ELECTROCARDIOGRAM TRACING: CPT | Performed by: STUDENT IN AN ORGANIZED HEALTH CARE EDUCATION/TRAINING PROGRAM

## 2022-03-08 RX ORDER — 0.9 % SODIUM CHLORIDE 0.9 %
1000 INTRAVENOUS SOLUTION INTRAVENOUS ONCE
Status: COMPLETED | OUTPATIENT
Start: 2022-03-08 | End: 2022-03-08

## 2022-03-08 RX ADMIN — IOPAMIDOL 100 ML: 755 INJECTION, SOLUTION INTRAVENOUS at 16:28

## 2022-03-08 RX ADMIN — SODIUM CHLORIDE 1000 ML: 9 INJECTION, SOLUTION INTRAVENOUS at 17:43

## 2022-03-08 ASSESSMENT — PAIN SCALES - GENERAL
PAINLEVEL_OUTOF10: 4
PAINLEVEL_OUTOF10: 2
PAINLEVEL_OUTOF10: 5

## 2022-03-08 ASSESSMENT — PAIN DESCRIPTION - PAIN TYPE
TYPE: ACUTE PAIN

## 2022-03-08 ASSESSMENT — HEART SCORE: ECG: 1

## 2022-03-08 ASSESSMENT — PAIN DESCRIPTION - LOCATION
LOCATION: ARM;SHOULDER
LOCATION: ARM;SHOULDER
LOCATION: CHEST

## 2022-03-08 ASSESSMENT — PAIN DESCRIPTION - ORIENTATION
ORIENTATION: LEFT
ORIENTATION: LEFT

## 2022-03-08 ASSESSMENT — PAIN - FUNCTIONAL ASSESSMENT: PAIN_FUNCTIONAL_ASSESSMENT: 0-10

## 2022-03-08 NOTE — ED PROVIDER NOTES
I independently examined and evaluated Mitul Alfred. In brief, patient is a 64-year-old female, presenting with concerns for left-sided chest, shoulder, arm pain, shortness of breath. Patient states that this is the second time this is happened to her in the past few months. Marquis Ozuna Upon my exam states that she is currently feeling somewhat better. States that the pain is primarily in her left shoulder and arm, is not really in her chest anymore at this point. She does state that she had a DVT twice in the past after she delivered her children. She has not take anything for the pain. She denies any other complaints or concerns. Focused exam revealed patient resting comfortably, no acute distress. Heart regular rate and rhythm. Lungs clear to auscultation bilaterally. Abdomen soft, nondistended, nontender. No calf swelling or tenderness. She does have reproducible tenderness palpation over the left chest wall, left shoulder and left upper arm. Left upper extremity is neurovascularly intact with soft compartments, radial pulses 2+, capillary refill less than 2 seconds. Labs Reviewed   CBC WITH AUTO DIFFERENTIAL - Abnormal; Notable for the following components:       Result Value    Hemoglobin 11.9 (*)     All other components within normal limits   D-DIMER, QUANTITATIVE - Abnormal; Notable for the following components:    D-Dimer, Quant 235 (*)     All other components within normal limits   BASIC METABOLIC PANEL   TROPONIN   HCG, SERUM, QUALITATIVE     CTA CHEST ABDOMEN PELVIS W CONTRAST   Final Result   No evidence of aortic dissection or aneurysm. No acute aortic abnormality. No   central pulmonary embolism. Clear lungs. Normal heart size. No pleural or pericardial effusion. No acute or significant finding within the abdomen or pelvis.          XR CHEST (2 VW)   Final Result   There is a new 2 cm rounded opacity projecting over the left mid to upper   lung zone, with no correlate the prior CT. Recommend further evaluation with   a noncontrast chest CT to exclude neoplasm. ED course: Patient is a 39-year-old female, presenting with concerns for left-sided chest shoulder and arm pain. Patient seen in conjunction with TIM Sullivan, please refer to her note for further details of the patient's work-up and treatment. EKG with some nonspecific T wave flattening, no evidence of acute ischemia or dysrhythmias. Labs were performed and are reassuring. Troponin is negative. D-dimer is slightly elevated at 235 and given her previous history decision was made to perform a CT with pulmonary embolism which did not reveal any evidence of pulmonary embolism, clear lungs, normal heart and no pleural or pericardial effusion. She has no leukocytosis or anemia. She does have a family history of a heart attack in her grandfather in his mid 35s, no other risk factors for coronary artery disease. Given her nonspecific findings on her EKG, her heart score is 3. Feel that she is appropriate for discharge home with outpatient follow-up. Findings were discussed the patient is comfortable and in agreement with plan of care. She is given return precautions for the ED. Advised PCP follow-up. All diagnostic, treatment, and disposition decisions were made by myself in conjunction with the advanced practice provider. For all further details of the patient's emergency department visit, please see the advanced practice provider's documentation. 1. Chest pain, unspecified type    2. Lung granuloma ([facility]      Comment: Please note this report has been produced using speech recognition software and may contain errors related to that system including errors in grammar, punctuation, and spelling, as well as words and phrases that may be inappropriate. If there are any questions or concerns please feel free to contact the dictating provider for clarification.        Luz Marina Lugo MD  03/08/22 [id number]

## 2022-03-08 NOTE — ED PROVIDER NOTES
[address] [facility] ED  [address] [facility] [place]. [address]  Dept: [phone]  Loc: [address]    Chief Complaint   Patient presents with    Chest Pain     pain radiates into and down left arm, shortness of breath with this-started an hour ago-blood pressure elevated, had similar symptoms Sunday        HPI    Lurlean Hodgkins is a 35 y.o. female who presents to the emergency department with complaints of left sided dullness that occurred while she was eating lunch today at 1230. It is been constant since the onset. It radiates into the left shoulder and a little into the left neck. She developed lightheadedness and feelings of shortness of breath at the onset. She had similar symptoms on Sunday. She states she had a nurse check her blood pressure and it was 154/97 and this is why she decided to come to the emergency department. She denies dizziness, nausea or vomiting. She states the chest dullness is still there and rates it at a 3/10 currently. She no longer feels short of breath. She has a history of DVT postpartum 17 years ago. She takes a baby aspirin for this currently. She also had Covid over a year ago. REVIEW OF SYSTEMS    Cardiac: see HPI, no syncope  Respiratory: + shortness of breath, no cough, no hemoptysis  GI: No vomiting or diarrhea  : No dysuria or hematuria  General: No fever or chills  All other systems reviewed and are negative. PAST MEDICAL & SURGICAL HISTORY    Past Medical History:   Diagnosis Date    DVT (deep venous thrombosis) ([address] 2005    RLE (2 concurrent clots); post-partum; was on Lovenox for 3 mo.     History of blood clots      Past Surgical History:   Procedure Laterality Date    NASAL ENDOSCOPY  12/23/13    nasal endoscopy, removal of foreign body    TONSILLECTOMY      WRIST SURGERY  1995    plate put in       [address]  (may include Stability:     Unable to Pay for Housing in the Last Year: Not on file    Number of Places Lived in the Last Year: Not on file    Unstable Housing in the Last Year: Not on file     Family History   Problem Relation Age of Onset    Cancer Maternal Grandmother [de-identified]        lung CA spread to breast    Heart Disease Maternal Grandfather 58        CHF    Cancer Paternal Grandfather         prostate    Alzheimer's Disease Paternal Grandfather     Mult Sclerosis Mother     Breast Cancer Mother 54    Heart Disease Paternal Grandmother        PHYSICAL EXAM    VITAL SIGNS: /83   Pulse 51   Temp 98.6 °F (37 °C) (Tympanic)   Resp 12   LMP 02/15/2022   SpO2 100%    Constitutional:  Well developed, well nourished, no acute distress   HENT:  Atraumatic, moist mucus membranes  Neck: supple, no JVD   Respiratory:  Lungs clear to auscultation bilaterally, no retractions   Cardiovascular: Regular rhythm and rate, S1-S2. No murmurs  Chest Wall: tenderness with palpation of the left chest without crepitance. Vascular: Radial and DP pulses 2+ and equal bilaterally brisk capillary refill to fingers  GI:  Soft, nontender, nondistended abdomen without rebound or guarding, normal bowel sounds  Musculoskeletal:  no lower extremity edema, no lower extremity asymmetry, no calf tenderness, no thigh tenderness, no acute deformities  Integument:  Skin warm and dry, no petechiae   Neurologic:  Alert & oriented x3, no slurred speech  Psych: Pleasant affect, no hallucinations    EKG    12 lead EKG reviewed by myself and interpreted by my attending physician   Ventricular rate 55 bpm, parable 146 ms, QRS duration 100 ms,  ms  No ST elevation or depression. T wave inversion in V2. Interpretation is a sinus bradycardia. Today's tracing was compared to the one on October 21, 2021 with no significant changes. She had T wave inversion in V2 on this tracing as well.     RADIOLOGY/PROCEDURES    CTA CHEST ABDOMEN PELVIS W CONTRAST   Final Result   No evidence of aortic dissection or aneurysm. No acute aortic abnormality. No   central pulmonary embolism. Clear lungs. Normal heart size. No pleural or pericardial effusion. No acute or significant finding within the abdomen or pelvis. XR CHEST (2 VW)   Final Result   There is a new 2 cm rounded opacity projecting over the left mid to upper   lung zone, with no correlate the prior CT. Recommend further evaluation with   a noncontrast chest CT to exclude neoplasm. Labs Reviewed   CBC WITH AUTO DIFFERENTIAL - Abnormal; Notable for the following components:       Result Value    Hemoglobin 11.9 (*)     All other components within normal limits   D-DIMER, QUANTITATIVE - Abnormal; Notable for the following components:    D-Dimer, Quant 235 (*)     All other components within normal limits   BASIC METABOLIC PANEL   TROPONIN   HCG, SERUM, QUALITATIVE       ED COURSE & MEDICAL DECISION MAKING    Pertinent Labs & Imaging studies reviewed and interpreted. (See chart for details)  The patient was immediately placed on the cardiac monitor. IV access obtained. See chart for details of medications given during the ED stay. Vitals:    03/08/22 1358 03/08/22 1558 03/08/22 1804   BP: (!) 155/95 (!) 142/86 127/83   Pulse: 60 62 51   Resp: 16 16 12   Temp: 98.6 °F (37 °C)     TempSrc: Tympanic     SpO2: 100% 100% 100%       Medications   0.9 % sodium chloride bolus (0 mLs IntraVENous Stopped 3/8/22 1815)   iopamidol (ISOVUE-370) 76 % injection 100 mL (100 mLs IntraVENous Given 3/8/22 1628)     This patient was seen and evaluated by myself and my attending physician Dr. Juan Hogue. Differential diagnosis includes was not limited to ACS, myocardial infarction, thoracic aortic dissection, pericarditis, PE, pleurisy, other. She is nontoxic in appearance and hemodynamically stable. She has blood pressure on arrival 155/95. No history of hypertension.   She does have a history of DVT postpartum 17 years ago when she currently takes a baby aspirin. No history of PE. Developed left-sided chest dullness that radiates into the neck and left arm at rest.  Its been constant since the onset. No acute changes on EKG. Chest x-ray interpreted by radiology and reviewed by myself showed  There is a new 2 cm rounded opacity projecting over the left mid to upper   lung zone, with no correlate the prior CT.  Recommend further evaluation with   a noncontrast chest CT to exclude neoplasm. Hemoglobin 11.9. Otherwise CBC unremarkable. Troponin less than 0.01. She has a negative qualitative hCG. Chemistry panel unremarkable. CTA of the chest interpreted by radiology shows  No evidence of aortic dissection or aneurysm. No acute aortic abnormality. No   central pulmonary embolism.       Clear lungs.  Normal heart size.  No pleural or pericardial effusion.       No acute or significant finding within the abdomen or pelvis. Heart score is 3. I explained to the patient and her mother who was at the bedside that she does have scattered lung granulomas that were seen on CT but this was not the cause of her symptoms. This could be in light of her Covid infection that she had. She does not have a primary care physician. I instructed her to schedule a follow-up appointment. I placed her on them no PCP urgent referral list for follow-up. Otherwise she should return to the emergency department for worsening symptoms. The patient verbalized understanding of the discharge instructions. FINAL IMPRESSION    1. Chest pain, unspecified type    2.  Lung granuloma [facility])        PLAN  Discharge    (Please note that this note was completed with a voice recognition program.  Every attempt was made to edit the dictations, but inevitably there remain words that are mis-transcribed.)       MARIE Mckenzie - CNP  03/09/22 4461

## 2022-03-08 NOTE — ED NOTES
--Patient provided with discharge instructions and any prescriptions. --Instructions, dosing, and follow-up appointments reviewed with patient/family. No further questions or needs at this time. --Vital signs and patient stable upon discharge. --Patient ambulatory to [facility].        Vaishnavi Silverio RN  03/08/22 6069

## 2022-03-29 ENCOUNTER — OFFICE VISIT (OUTPATIENT)
Dept: FAMILY MEDICINE CLINIC | Age: 42
End: 2022-03-29
Payer: COMMERCIAL

## 2022-03-29 VITALS
HEIGHT: 64 IN | WEIGHT: 156 LBS | SYSTOLIC BLOOD PRESSURE: 104 MMHG | DIASTOLIC BLOOD PRESSURE: 70 MMHG | BODY MASS INDEX: 26.63 KG/M2

## 2022-03-29 DIAGNOSIS — R00.2 PALPITATIONS: ICD-10-CM

## 2022-03-29 DIAGNOSIS — R06.02 SHORTNESS OF BREATH: ICD-10-CM

## 2022-03-29 DIAGNOSIS — R07.9 CHEST PAIN, UNSPECIFIED TYPE: Primary | ICD-10-CM

## 2022-03-29 PROCEDURE — G8484 FLU IMMUNIZE NO ADMIN: HCPCS

## 2022-03-29 PROCEDURE — G8427 DOCREV CUR MEDS BY ELIG CLIN: HCPCS

## 2022-03-29 PROCEDURE — 1036F TOBACCO NON-USER: CPT

## 2022-03-29 PROCEDURE — G8419 CALC BMI OUT NRM PARAM NOF/U: HCPCS

## 2022-03-29 PROCEDURE — 1111F DSCHRG MED/CURRENT MED MERGE: CPT

## 2022-03-29 PROCEDURE — 99203 OFFICE O/P NEW LOW 30 MIN: CPT

## 2022-03-29 RX ORDER — LORATADINE 10 MG/1
10 CAPSULE, LIQUID FILLED ORAL DAILY
COMMUNITY
End: 2022-06-20

## 2022-03-29 ASSESSMENT — ENCOUNTER SYMPTOMS
BACK PAIN: 0
COLOR CHANGE: 0
ABDOMINAL DISTENTION: 0
EYE PAIN: 0
CHEST TIGHTNESS: 0
DIARRHEA: 0
SHORTNESS OF BREATH: 0
COUGH: 0
CONSTIPATION: 0
SORE THROAT: 0
EYE DISCHARGE: 0
ABDOMINAL PAIN: 0

## 2022-03-29 ASSESSMENT — PATIENT HEALTH QUESTIONNAIRE - PHQ9
SUM OF ALL RESPONSES TO PHQ QUESTIONS 1-9: 0
2. FEELING DOWN, DEPRESSED OR HOPELESS: 0
1. LITTLE INTEREST OR PLEASURE IN DOING THINGS: 0
SUM OF ALL RESPONSES TO PHQ QUESTIONS 1-9: 0
SUM OF ALL RESPONSES TO PHQ9 QUESTIONS 1 & 2: 0

## 2022-03-29 NOTE — PROGRESS NOTES
[facility] Medicine  Establish care visit   3/29/2022    Romelia Han (:  1980) is a 35 y.o. female, here to establish care. Chief Complaint   Patient presents with    Follow-Up from Hospital     went on 3/8 for chest pain     Establish Care     no former pcp only seeing her obgyn        ASSESSMENT/ PLAN  1. Chest pain, unspecified type  -Work-up in the ED in both October and March were negative for any acute abnormality. EKGs were reviewed by me in the office with no change. However, considering that she had 2 episodes that mimicked each other very closely with chest pain, pressure, shortness of breath, palpitations, diaphoresis and left arm tingling and numbness that occurred at rest; I feel that it is warranted that we proceed with a stress echo to rule out any underlying cardiac factors.  -There is a suspicion for coronary blockage  -Family history of paternal grandmother having an MI as well as maternal grandfather having MI  -Siblings have no medical history    - Lipid Panel; Future  - ECHO Exercise Stress Test; Future  - TSH with Reflex; Mitchell  - Kala Madsen MD, Cardiology, [facility]  - AL DISCHARGE MEDS RECONCILED W/ CURRENT OUTPATIENT MED LIST    2. Shortness of breath  -See #1  - Lipid Panel; Future  - ECHO Exercise Stress Test; Future  - [facility]lex[facility]    3. Palpitations  -See #1  - ECHO Exercise Stress Test; Future  - [facility]SH with Reflex; [facility]         Preventative Care:  Mammo UTD  PAP UTD      Return in about 6 weeks (around 5/10/2022), or if symptoms worsen or fail to improve, for Follow up after stress/echo. HPI  Patient is here as an ED follow up. She recently was seen in the ED on 3/8 for chest pain. Was seen for same symptoms in 10/2021 as well. Patient has had 2 episodes of chest pain not a very similar nature.   They both occurred while at rest.  In October she was awoken from her sleep with chest pressure and pain that started in the middle of the chest that went to left shoulder, left back and down the left arm. Chest pressure was described as if somebody had strapped a belt around her chest and was tightening it. She also had numbness and tingling in the left hand. Other associated symptoms were palpitations, shortness of breath and diaphoresis. At that time her blood pressure was high in the 905O and 808E systolically. She did proceed to go to the ED. Pain at the time was 7-8 out of 10.  Episode lasted about 60 minutes. She then had a recurrent episode that began on March 8 while she was at work sitting in a resting position eating lunch. Symptoms presented the exact same. Chest pain started in the middle of the chest went to the left shoulder, left back and down the left arm with numbness and tingling. She did feel diaphoretic as well as short of breath and did feel palpitations. She proceeded to go to the nurses office where she had a blood pressure checked and it was also in the 561L to 746G systolically. She has never had any issues with blood pressure. Typically her blood pressure runs in the low 438F to 899T systolically. Today she does not report any symptoms of chest pain, pressure. Work-up in the ED was negative EKGs were unchanged. Troponins were also negative. D-dimer was mildly elevated. She did get a CTA of the chest and which was negative for PE. She does have a history of DVT about 17 years ago. She does take a baby aspirin. Chest Pain   This is a recurrent problem. The onset quality is sudden. The problem occurs intermittently (x 2 episodes, 5 months apart). The problem has been unchanged. The pain is at a severity of 7/10 (when chest pain occurs. ). The pain is severe. The quality of the pain is described as squeezing, tightness and heavy.  The pain radiates to the left shoulder and left arm. Associated symptoms include irregular heartbeat (palpitations). Pertinent negatives include no abdominal pain, back pain, cough, diaphoresis, dizziness, exertional chest pressure, fever, headaches, numbness, palpitations or shortness of breath. Near-syncope: light headed. The pain is aggravated by nothing. She has tried nothing for the symptoms. The treatment provided no relief. Her past medical history is significant for DVT. Pertinent negatives for past medical history include no aneurysm, no anxiety/panic attacks, no aortic aneurysm, no arrhythmia, no CAD, no cancer, no diabetes and no stimulant use. Her family medical history is significant for CAD and hypertension. Pertinent negatives for family medical history include: no aortic dissection, no heart disease, no hyperlipidemia, no early MI and no PVD. Prior diagnostic workup includes echocardiogram and stress echo. ROS  Review of Systems   Constitutional: Negative for chills, diaphoresis, fever and unexpected weight change. HENT: Negative for congestion, dental problem and sore throat. Eyes: Negative for pain and discharge. Respiratory: Negative for cough, chest tightness and shortness of breath. Cardiovascular: Negative for chest pain, palpitations and leg swelling. Near-syncope: light headed. Gastrointestinal: Negative for abdominal distention, abdominal pain, constipation and diarrhea. Endocrine: Negative for polydipsia, polyphagia and polyuria. Genitourinary: Negative for dysuria, flank pain, hematuria and urgency. Musculoskeletal: Negative for arthralgias, back pain, joint swelling and myalgias. Skin: Negative for color change and rash. Neurological: Negative for dizziness, light-headedness, numbness and headaches. Psychiatric/Behavioral: Negative for agitation and behavioral problems. The patient is not nervous/anxious.          HISTORIES  Current Outpatient Medications on File Prior to Visit   Medication Sig Dispense Refill    loratadine (CLARITIN) 10 MG capsule Take 10 mg by mouth daily      aspirin 81 MG chewable tablet Take 81 mg by mouth daily. No current facility-administered medications on file prior to visit. No Known Allergies    Past Medical History:   Diagnosis Date    [address] (deep venous [address]) [address] [address] 2005    RLE (2 concurrent clots); post-partum; was on Lovenox for 3 mo.  History of blood clots        There is no problem list on file for this patient. Past Surgical History:   Procedure Laterality Date    NASAL ENDOSCOPY  12/23/13    nasal endoscopy, removal of foreign body    TONSILLECTOMY      WRIST SURGERY  1995    plate put in       Social History     Socioeconomic History    Marital status:      Spouse name: Not on file    Number of children: Not on file    Years of education: Not on file    Highest education level: Not on file   Occupational History    Not on file   Tobacco Use    Smoking status: Never Smoker    Smokeless tobacco: Never Used   Substance and Sexual Activity    Alcohol use: No    Drug use: No    Sexual activity: Yes     Partners: Male   Other Topics Concern    Not on file   Social History Narrative    Not on file     Social Determinants of Health     Financial Resource Strain:     Difficulty of Paying Living Expenses: Not on file   Food Insecurity:     Worried About Running Out of Food in the Last Year: Not on file    Joe of Food in the Last Year: Not on file   Transportation Needs:     Lack of Transportation (Medical): Not on file    Lack of Transportation (Non-Medical):  Not on file   Physical Activity:     Days of Exercise per Week: Not on file    Minutes of Exercise per Session: Not on file   Stress:     Feeling of Stress : Not on file   Social Connections:     Frequency of Communication with Friends and Family: Not on file    Frequency of Social Gatherings with Friends and Family: Not on file    Attends Scientologist Services: Not on file    Active Member of Clubs or Organizations: Not on file    Attends Club or Organization Meetings: Not on file    Marital Status: Not on file   Intimate Partner Violence:     Fear of Current or Ex-Partner: Not on file    Emotionally Abused: Not on file    Physically Abused: Not on file    Sexually Abused: Not on file   Housing Stability:     Unable to Pay for Housing in the Last Year: Not on file    Number of [place] in the Last Year: Not on file    Unstable Housing in the Last Year: Not on file        Family History   Problem Relation Age of Onset    Cancer Maternal Grandmother [de-identified]        lung CA spread to breast    Heart Disease Maternal Grandfather 58        CHF    Cancer Paternal Grandfather         prostate    Alzheimer's Disease Paternal Grandfather     Mult Sclerosis Mother     Breast Cancer Mother 54    Heart Disease Paternal Grandmother        PE  Vitals:    03/29/22 1614   BP: 104/70   Site: Left Upper Arm   Position: Sitting   Cuff Size: Medium Adult   Weight: 156 lb (70.8 kg)   Height: 5' 4\" (1.626 m)     Estimated body mass index is 26.78 kg/m² as calculated from the following:    Height as of this encounter: 5' 4\" (1.626 m). Weight as of this encounter: 156 lb (70.8 kg). Physical Exam  Constitutional:       General: She is not in acute distress. Appearance: Normal appearance. She is not ill-appearing. HENT:      Head: Normocephalic. Right Ear: Tympanic membrane and external ear normal.      Left Ear: Tympanic membrane and external ear normal.      Nose: No congestion or rhinorrhea. Mouth/Throat:      Mouth: Mucous membranes are moist.      Pharynx: Oropharynx is clear. No posterior oropharyngeal erythema. Eyes:      Extraocular Movements: Extraocular movements intact. Conjunctiva/sclera: Conjunctivae normal.      Pupils: Pupils are equal, round, and reactive to light.    Cardiovascular:      Rate and Rhythm: Normal rate and regular rhythm. Pulses: Normal pulses. Heart sounds: Normal heart sounds. No murmur heard. Pulmonary:      Effort: Pulmonary effort is normal. No respiratory distress. Breath sounds: Normal breath sounds. No wheezing. Abdominal:      General: Abdomen is flat. Bowel sounds are normal.      Palpations: Abdomen is soft. Tenderness: There is no abdominal tenderness. Hernia: No hernia is present. Musculoskeletal:         General: No swelling. Normal range of motion. Cervical back: Normal range of motion and neck supple. No tenderness. Right lower leg: No edema. Left lower leg: No edema. Lymphadenopathy:      Cervical: No cervical adenopathy. Skin:     General: Skin is warm and dry. Capillary Refill: Capillary refill takes less than 2 seconds. Neurological:      General: No focal deficit present. Mental Status: She is alert and oriented to person, place, and time. Mental status is at baseline. Cranial Nerves: No cranial nerve deficit.    Psychiatric:         Mood and Affect: Mood normal.         Behavior: Behavior normal.         Judgment: Judgment normal.         Immunization History   Administered Date(s) Administered    COVID-19, Pfizer Purple top, DILUTE for use, 12+ yrs, 30mcg/0.3mL dose 09/18/2021, 10/09/2021       Health Maintenance   Topic Date Due    Depression Screen  Never done    DTaP/Tdap/Td vaccine (1 - Tdap) Never done    Cervical cancer screen  Never done    Lipid screen  Never done    Flu vaccine (1) Never done    COVID-19 Vaccine (3 - Booster for Pfizer series) 03/09/2022    Hepatitis C screen  03/29/2023 (Originally 1980)    HIV screen  03/29/2023 (Originally 8/12/1993)    Breast cancer screen  07/26/2022    Hepatitis A vaccine  Aged Out    Hepatitis B vaccine  Aged Out    Hib vaccine  Aged Out    Meningococcal (ACWY) vaccine  Aged Out    Pneumococcal 0-64 years Vaccine  Aged [employer], PMH, SH and  reviewed and noted. Recent and past labs, tests and consults also reviewed. Recent or new meds also reviewed. Tracy Abdi, APRN - CNP    This dictation was generated by voice recognition computer software. Although all attempts are made to edit the dictation for accuracy, there may be errors in the transcription that are not intended.

## 2022-03-30 ENCOUNTER — NURSE ONLY (OUTPATIENT)
Dept: FAMILY MEDICINE CLINIC | Age: 42
End: 2022-03-30
Payer: COMMERCIAL

## 2022-03-30 DIAGNOSIS — R00.2 PALPITATIONS: ICD-10-CM

## 2022-03-30 DIAGNOSIS — R06.02 SHORTNESS OF BREATH: ICD-10-CM

## 2022-03-30 DIAGNOSIS — R07.9 CHEST PAIN, UNSPECIFIED TYPE: ICD-10-CM

## 2022-03-30 LAB
CHOLESTEROL, TOTAL: 199 MG/DL (ref 0–199)
HDLC SERPL-MCNC: 57 MG/DL (ref 40–60)
LDL CHOLESTEROL CALCULATED: 129 MG/DL
TRIGL SERPL-MCNC: 63 MG/DL (ref 0–150)
TSH REFLEX: 1.69 UIU/ML (ref 0.27–4.2)
VLDLC SERPL CALC-MCNC: 13 MG/DL

## 2022-03-30 PROCEDURE — 36415 COLL VENOUS BLD VENIPUNCTURE: CPT

## 2022-04-01 NOTE — RESULT ENCOUNTER NOTE
Cholesterol panel shows some mild elevation LDL levels. No need to start medication. This could be fixed with lifestyle modifications such as exercise and diet. TSH is 1.69. This is within normal range.

## 2022-04-08 ENCOUNTER — HOSPITAL ENCOUNTER (OUTPATIENT)
Age: 42
Setting detail: OBSERVATION
Discharge: HOME | End: 2022-04-09
Attending: INTERNAL MEDICINE | Admitting: INTERNAL MEDICINE
Payer: COMMERCIAL

## 2022-04-08 ENCOUNTER — APPOINTMENT (OUTPATIENT)
Dept: GENERAL RADIOLOGY | Age: 42
End: 2022-04-08
Payer: COMMERCIAL

## 2022-04-08 ENCOUNTER — TELEPHONE (OUTPATIENT)
Dept: FAMILY MEDICINE CLINIC | Age: 42
End: 2022-04-08

## 2022-04-08 DIAGNOSIS — R07.9 CHEST PAIN, UNSPECIFIED TYPE: Primary | ICD-10-CM

## 2022-04-08 LAB
A/G RATIO: 2.3 (ref 1.1–2.2)
ALBUMIN SERPL-MCNC: 4.6 G/DL (ref 3.4–5)
ALP BLD-CCNC: 67 U/L (ref 40–129)
ALT SERPL-CCNC: 13 U/L (ref 10–40)
ANION GAP SERPL CALCULATED.3IONS-SCNC: 10 MMOL/L (ref 3–16)
AST SERPL-CCNC: 16 U/L (ref 15–37)
BASOPHILS ABSOLUTE: 0 K/UL (ref 0–0.2)
BASOPHILS RELATIVE PERCENT: 0.6 %
BILIRUB SERPL-MCNC: <0.2 MG/DL (ref 0–1)
BILIRUBIN URINE: NEGATIVE
BLOOD, URINE: NEGATIVE
BUN BLDV-MCNC: 10 MG/DL (ref 7–20)
CALCIUM SERPL-MCNC: 9.2 MG/DL (ref 8.3–10.6)
CHLORIDE BLD-SCNC: 103 MMOL/L (ref 99–110)
CLARITY: CLEAR
CO2: 26 MMOL/L (ref 21–32)
COLOR: YELLOW
CREAT SERPL-MCNC: 0.7 MG/DL (ref 0.6–1.1)
D DIMER: <200 NG/ML DDU (ref 0–229)
EKG ATRIAL RATE: 52 BPM
EKG DIAGNOSIS: NORMAL
EKG P AXIS: 41 DEGREES
EKG P-R INTERVAL: 144 MS
EKG Q-T INTERVAL: 430 MS
EKG QRS DURATION: 108 MS
EKG QTC CALCULATION (BAZETT): 399 MS
EKG R AXIS: 48 DEGREES
EKG T AXIS: 37 DEGREES
EKG VENTRICULAR RATE: 52 BPM
EOSINOPHILS ABSOLUTE: 0.1 K/UL (ref 0–0.6)
EOSINOPHILS RELATIVE PERCENT: 1.3 %
GFR AFRICAN AMERICAN: >60
GFR NON-AFRICAN AMERICAN: >60
GLUCOSE BLD-MCNC: 94 MG/DL (ref 70–99)
GLUCOSE URINE: NEGATIVE MG/DL
HCG QUALITATIVE: NEGATIVE
HCT VFR BLD CALC: 36.7 % (ref 36–48)
HEMOGLOBIN: 12.2 G/DL (ref 12–16)
INFLUENZA A: NOT DETECTED
INFLUENZA B: NOT DETECTED
INR BLD: 0.97 (ref 0.88–1.12)
KETONES, URINE: NEGATIVE MG/DL
LEUKOCYTE ESTERASE, URINE: NEGATIVE
LYMPHOCYTES ABSOLUTE: 1.2 K/UL (ref 1–5.1)
LYMPHOCYTES RELATIVE PERCENT: 30.5 %
MCH RBC QN AUTO: 27.2 PG (ref 26–34)
MCHC RBC AUTO-ENTMCNC: 33.3 G/DL (ref 31–36)
MCV RBC AUTO: 81.8 FL (ref 80–100)
MICROSCOPIC EXAMINATION: NORMAL
MONOCYTES ABSOLUTE: 0.4 K/UL (ref 0–1.3)
MONOCYTES RELATIVE PERCENT: 9.8 %
NEUTROPHILS ABSOLUTE: 2.3 K/UL (ref 1.7–7.7)
NEUTROPHILS RELATIVE PERCENT: 57.8 %
NITRITE, URINE: NEGATIVE
PDW BLD-RTO: 14.3 % (ref 12.4–15.4)
PH UA: 6 (ref 5–8)
PLATELET # BLD: 187 K/UL (ref 135–450)
PMV BLD AUTO: 8.9 FL (ref 5–10.5)
POTASSIUM REFLEX MAGNESIUM: 4 MMOL/L (ref 3.5–5.1)
PRO-BNP: 58 PG/ML (ref 0–124)
PROTEIN UA: NEGATIVE MG/DL
PROTHROMBIN TIME: 11 SEC (ref 9.9–12.7)
RBC # BLD: 4.48 M/UL (ref 4–5.2)
SARS-COV-2 RNA, RT PCR: NOT DETECTED
SODIUM BLD-SCNC: 139 MMOL/L (ref 136–145)
SPECIFIC GRAVITY UA: <=1.005 (ref 1–1.03)
TOTAL PROTEIN: 6.6 G/DL (ref 6.4–8.2)
TROPONIN: <0.01 NG/ML
TROPONIN: <0.01 NG/ML
URINE REFLEX TO CULTURE: NORMAL
URINE TYPE: NORMAL
UROBILINOGEN, URINE: 0.2 E.U./DL
WBC # BLD: 4 K/UL (ref 4–11)

## 2022-04-08 PROCEDURE — 2580000003 HC RX 258: Performed by: NURSE PRACTITIONER

## 2022-04-08 PROCEDURE — 85379 FIBRIN DEGRADATION QUANT: CPT

## 2022-04-08 PROCEDURE — 6360000002 HC RX W HCPCS: Performed by: NURSE PRACTITIONER

## 2022-04-08 PROCEDURE — 99285 EMERGENCY DEPT VISIT HI MDM: CPT

## 2022-04-08 PROCEDURE — 84703 CHORIONIC GONADOTROPIN ASSAY: CPT

## 2022-04-08 PROCEDURE — 93005 ELECTROCARDIOGRAM TRACING: CPT | Performed by: NURSE PRACTITIONER

## 2022-04-08 PROCEDURE — 83880 ASSAY OF NATRIURETIC PEPTIDE: CPT

## 2022-04-08 PROCEDURE — G0378 HOSPITAL OBSERVATION PER HR: HCPCS

## 2022-04-08 PROCEDURE — 81003 URINALYSIS AUTO W/O SCOPE: CPT

## 2022-04-08 PROCEDURE — 6370000000 HC RX 637 (ALT 250 FOR IP): Performed by: NURSE PRACTITIONER

## 2022-04-08 PROCEDURE — 85025 COMPLETE CBC W/AUTO DIFF WBC: CPT

## 2022-04-08 PROCEDURE — 85610 PROTHROMBIN TIME: CPT

## 2022-04-08 PROCEDURE — 96372 THER/PROPH/DIAG INJ SC/IM: CPT

## 2022-04-08 PROCEDURE — 87636 SARSCOV2 & INF A&B AMP PRB: CPT

## 2022-04-08 PROCEDURE — 99221 1ST HOSP IP/OBS SF/LOW 40: CPT

## 2022-04-08 PROCEDURE — 96361 HYDRATE IV INFUSION ADD-ON: CPT

## 2022-04-08 PROCEDURE — 36415 COLL VENOUS BLD VENIPUNCTURE: CPT

## 2022-04-08 PROCEDURE — 80053 COMPREHEN METABOLIC PANEL: CPT

## 2022-04-08 PROCEDURE — 71046 X-RAY EXAM CHEST 2 VIEWS: CPT

## 2022-04-08 PROCEDURE — 84484 ASSAY OF TROPONIN QUANT: CPT

## 2022-04-08 RX ORDER — POTASSIUM CHLORIDE 7.45 MG/ML
10 INJECTION INTRAVENOUS PRN
Status: DISCONTINUED | OUTPATIENT
Start: 2022-04-08 | End: 2022-04-09 | Stop reason: HOSPADM

## 2022-04-08 RX ORDER — SODIUM CHLORIDE 0.9 % (FLUSH) 0.9 %
5-40 SYRINGE (ML) INJECTION EVERY 12 HOURS SCHEDULED
Status: DISCONTINUED | OUTPATIENT
Start: 2022-04-08 | End: 2022-04-09 | Stop reason: HOSPADM

## 2022-04-08 RX ORDER — ASPIRIN 81 MG/1
162 TABLET, CHEWABLE ORAL ONCE
Status: COMPLETED | OUTPATIENT
Start: 2022-04-08 | End: 2022-04-08

## 2022-04-08 RX ORDER — 0.9 % SODIUM CHLORIDE 0.9 %
1000 INTRAVENOUS SOLUTION INTRAVENOUS ONCE
Status: COMPLETED | OUTPATIENT
Start: 2022-04-08 | End: 2022-04-08

## 2022-04-08 RX ORDER — NITROGLYCERIN 0.4 MG/1
0.4 TABLET SUBLINGUAL EVERY 5 MIN PRN
Status: DISCONTINUED | OUTPATIENT
Start: 2022-04-08 | End: 2022-04-09 | Stop reason: HOSPADM

## 2022-04-08 RX ORDER — ONDANSETRON 2 MG/ML
4 INJECTION INTRAMUSCULAR; INTRAVENOUS EVERY 6 HOURS PRN
Status: DISCONTINUED | OUTPATIENT
Start: 2022-04-08 | End: 2022-04-09 | Stop reason: HOSPADM

## 2022-04-08 RX ORDER — ONDANSETRON 4 MG/1
4 TABLET, ORALLY DISINTEGRATING ORAL EVERY 8 HOURS PRN
Status: DISCONTINUED | OUTPATIENT
Start: 2022-04-08 | End: 2022-04-09 | Stop reason: HOSPADM

## 2022-04-08 RX ORDER — POLYETHYLENE GLYCOL 3350 17 G/17G
17 POWDER, FOR SOLUTION ORAL DAILY PRN
Status: DISCONTINUED | OUTPATIENT
Start: 2022-04-08 | End: 2022-04-09 | Stop reason: HOSPADM

## 2022-04-08 RX ORDER — ACETAMINOPHEN 650 MG/1
650 SUPPOSITORY RECTAL EVERY 6 HOURS PRN
Status: DISCONTINUED | OUTPATIENT
Start: 2022-04-08 | End: 2022-04-09 | Stop reason: HOSPADM

## 2022-04-08 RX ORDER — SODIUM CHLORIDE 0.9 % (FLUSH) 0.9 %
5-40 SYRINGE (ML) INJECTION PRN
Status: DISCONTINUED | OUTPATIENT
Start: 2022-04-08 | End: 2022-04-09 | Stop reason: HOSPADM

## 2022-04-08 RX ORDER — POTASSIUM CHLORIDE 20 MEQ/1
40 TABLET, EXTENDED RELEASE ORAL PRN
Status: DISCONTINUED | OUTPATIENT
Start: 2022-04-08 | End: 2022-04-09 | Stop reason: HOSPADM

## 2022-04-08 RX ORDER — ATORVASTATIN CALCIUM 40 MG/1
40 TABLET, FILM COATED ORAL NIGHTLY
Status: DISCONTINUED | OUTPATIENT
Start: 2022-04-08 | End: 2022-04-09 | Stop reason: HOSPADM

## 2022-04-08 RX ORDER — SODIUM CHLORIDE 9 MG/ML
INJECTION, SOLUTION INTRAVENOUS PRN
Status: DISCONTINUED | OUTPATIENT
Start: 2022-04-08 | End: 2022-04-09 | Stop reason: HOSPADM

## 2022-04-08 RX ORDER — CETIRIZINE HYDROCHLORIDE 10 MG/1
10 TABLET ORAL DAILY
Status: DISCONTINUED | OUTPATIENT
Start: 2022-04-08 | End: 2022-04-09 | Stop reason: HOSPADM

## 2022-04-08 RX ORDER — ASPIRIN 81 MG/1
81 TABLET, CHEWABLE ORAL DAILY
Status: DISCONTINUED | OUTPATIENT
Start: 2022-04-09 | End: 2022-04-09 | Stop reason: HOSPADM

## 2022-04-08 RX ORDER — ACETAMINOPHEN 325 MG/1
650 TABLET ORAL EVERY 6 HOURS PRN
Status: DISCONTINUED | OUTPATIENT
Start: 2022-04-08 | End: 2022-04-09 | Stop reason: HOSPADM

## 2022-04-08 RX ADMIN — SODIUM CHLORIDE 1000 ML: 9 INJECTION, SOLUTION INTRAVENOUS at 11:35

## 2022-04-08 RX ADMIN — ASPIRIN 81 MG 162 MG: 81 TABLET ORAL at 11:15

## 2022-04-08 RX ADMIN — NITROGLYCERIN 0.4 MG: 0.4 TABLET SUBLINGUAL at 11:15

## 2022-04-08 RX ADMIN — ENOXAPARIN SODIUM 40 MG: 40 INJECTION SUBCUTANEOUS at 15:00

## 2022-04-08 RX ADMIN — ATORVASTATIN CALCIUM 40 MG: 40 TABLET, FILM COATED ORAL at 20:48

## 2022-04-08 RX ADMIN — NITROGLYCERIN 0.4 MG: 0.4 TABLET SUBLINGUAL at 13:08

## 2022-04-08 RX ADMIN — Medication 10 ML: at 20:49

## 2022-04-08 ASSESSMENT — PAIN DESCRIPTION - DESCRIPTORS: DESCRIPTORS: SQUEEZING

## 2022-04-08 ASSESSMENT — PAIN SCALES - GENERAL
PAINLEVEL_OUTOF10: 3
PAINLEVEL_OUTOF10: 0
PAINLEVEL_OUTOF10: 2
PAINLEVEL_OUTOF10: 0

## 2022-04-08 ASSESSMENT — ENCOUNTER SYMPTOMS
CHEST TIGHTNESS: 1
SHORTNESS OF BREATH: 1
COLOR CHANGE: 0
ABDOMINAL PAIN: 0
RHINORRHEA: 0
FACIAL SWELLING: 0
SORE THROAT: 0

## 2022-04-08 ASSESSMENT — PAIN DESCRIPTION - LOCATION: LOCATION: CHEST

## 2022-04-08 NOTE — ED PROVIDER NOTES
ECG    The Ekg interpreted by me shows sinus bradycardia with a rate of 52 bpm.  Normal axis. No acute injury pattern. , QRS 98, QTc 399.     No significant change from prior EKG dated 3/8/2022     Waylan Mortimer, DO  04/08/22 1721

## 2022-04-08 NOTE — ED NOTES
bp 103 after first nitro. Second nitro held. Stefanie Mathur np notified. Fluids ordered.      Shashi Arias, RN  04/08/22 Melissa Grover, BHASKAR  04/08/22 1201

## 2022-04-08 NOTE — TELEPHONE ENCOUNTER
I called and spoke with patient regarding symptoms. She does have recurrent chest pain 6-7 out of 10 that is similar fashion to her prior episodes. I did instruct her to the ER where she will most likely be admitted and have a stress test evaluation.   I do not want her to wait until 4/15 for stress exam

## 2022-04-08 NOTE — FLOWSHEET NOTE
04/08/22 1423   Vital Signs   Temp 97.6 °F (36.4 °C)   Temp Source Oral   Pulse 53   Heart Rate Source Monitor   Resp 16   /77   BP Location Right upper arm   Patient Position Semi fowlers   Level of Consciousness Alert (0)   MEWS Score 1   Patient Currently in Pain Denies   Oxygen Therapy   SpO2 99 %   Pulse Oximeter Device Mode Intermittent   Pulse Oximeter Device Location Finger   O2 Device None (Room air)   Patient admitted to pcu. Orders released. Alert and oriented X4. Oriented to room. Declined 4eyes. Patient is able to demonstrate the ability to move from a reclining position to an upright position within the recliner.

## 2022-04-08 NOTE — CARE COORDINATION
CM reviewed chart and met with pt at bedside to discuss any potential discharge needs. No needs identified for discharge intervention at this time. MD and bedside RN  if needs arise please consult case management for discharge intervention. CM not following at this time.      Alexa Abdi RN

## 2022-04-08 NOTE — H&P
Hospital Medicine History & Physical      PCP: Nathan Paulino, MARIE - CNP    Date of Admission: 4/8/2022    Date of Service: Pt seen/examined on 4/8/2022     Chief Complaint:    Chief Complaint   Patient presents with    Chest Pain     pt has squeezing pain in chest.  started last night.  has had a prior episode. scheduled to have stress test next week. sent here to be admitted to get test sooner. History Of Present Illness: The patient is a 35 y.o. female with pmhx of VTEs who presented to [facility] ED with complaint of chest pain and shortness of breath that began last night. Pain feels like a squeezing in the chest that radiates up the left shoulder and around her shoulder blade, pain comes and goes. Shortness of breath is sudden and fleeting with chest pain, worse with exertion. Pt has had 2 other similar episodes with same characteristics over the past year. Each time, she begins to feel light-headed and dizzy but no syncope. Pt also reports elevated BP readings with each episode and diaphoresis. PCP encouraged her to come to ED to get stress test. Pt was scheduled for stress test and echo next week with Dr. Víctor Tavera. Pain was 6/10 now down to 4/10 after nitro. Pt takes baby ASA at home, no other medications. No other health conditions. 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 Does have + family hx of heart disease. No fever, chills, cough, n/v/d, hematuria, dysuria. CBC- hemodynamically stable. Labs unremarkable. D-dimer negative. Initial Troponin negative. EKG final report pending, but with sinus bradycardia, cannot rule out anterior infarct, age undetermined (similar reading when compared with last EKG) CXR negative. IVF, nitro, and ASA given. Past Medical History:        Diagnosis Date    DVT (deep venous thrombosis) ([address] 2005    RLE (2 concurrent clots); post-partum; was on Lovenox for 3 mo.     History of blood clots        Past Surgical History:        Procedure Laterality Date    NASAL ENDOSCOPY  12/23/13    nasal endoscopy, removal of foreign body    TONSILLECTOMY      WRIST SURGERY  1995    plate put in       Medications Prior to Admission:    Prior to Admission medications    Medication Sig Start Date End Date Taking? Authorizing Provider   loratadine (CLARITIN) 10 MG capsule Take 10 mg by mouth daily    Historical Provider, MD   aspirin 81 MG chewable tablet Take 81 mg by mouth daily. Historical Provider, MD       Allergies:  Patient has no known allergies. Social History:  The patient currently lives at home    TOBACCO:   reports that she has never smoked. She has never used smokeless tobacco.  ETOH:   reports no history of alcohol use. Family History:   Positive as follows:        Problem Relation Age of Onset    Cancer Maternal Grandmother [de-identified]        lung CA spread to breast    Heart Disease Maternal Grandfather 58        CHF    Cancer Paternal Grandfather         prostate    Alzheimer's Disease Paternal Grandfather     Mult Sclerosis Mother     Breast Cancer Mother 54    Heart Disease Paternal Grandmother        REVIEW OF SYSTEMS:       Constitutional: Negative for fever   HENT: Negative for sore throat   Eyes: Negative for redness   Respiratory: Negative for cough, + dyspnea   Cardiovascular: + chest pain   Gastrointestinal: Negative for vomiting, diarrhea   Genitourinary: Negative for hematuria   Musculoskeletal: Negative for arthralgias   Skin: Negative for rash   Neurological: Negative for syncope   Hematological: Negative for adenopathy   Psychiatric/Behavorial: Negative for anxiety    PHYSICAL EXAM:    /70   Pulse 62   Temp 98 °F (36.7 °C) (Oral)   Resp 12   Ht 5' 4\" (1.626 m)   Wt 152 lb (68.9 kg)   SpO2 100%   BMI 26.09 kg/m²      Gen: No distress. Alert. Eyes: PERRL. No sclera icterus. No conjunctival injection. Neck: No JVD. No Carotid Bruit. Trachea midline.   Resp: No accessory muscle use. No crackles. No wheezes. No rhonchi. CV: Regular rate. Regular rhythm. No murmur. No rub. No edema. Peripheral Pulses: +2 palpable, equal bilaterally   GI: Non-tender. Non-distended. No masses. No organomegaly. Normal bowel sounds. No hernia. Skin: Warm and dry. No nodule on exposed extremities. No rash on exposed extremities. M/S: No cyanosis. No joint deformity. No clubbing. Neuro: Awake. Grossly nonfocal    Psych: Oriented x 3. No anxiety or agitation. Lab Results   Component Value Date    WBC 4.0 04/08/2022    HGB 12.2 04/08/2022    HCT 36.7 04/08/2022    MCV 81.8 04/08/2022     04/08/2022     Lab Results   Component Value Date     04/08/2022    K 4.0 04/08/2022     04/08/2022    CO2 26 04/08/2022    BUN 10 04/08/2022    CREATININE 0.7 04/08/2022    GLUCOSE 94 04/08/2022    CALCIUM 9.2 04/08/2022    PROT 6.6 04/08/2022    LABALBU 4.6 04/08/2022    BILITOT <0.2 04/08/2022    ALKPHOS 67 04/08/2022    AST 16 04/08/2022    ALT 13 04/08/2022    LABGLOM >60 04/08/2022    GFRAA >60 04/08/2022    AGRATIO 2.3 (H) 04/08/2022    GLOB 2.6 10/14/2021       Lab Results   Component Value Date    DDIMER <200 04/08/2022       CARDIAC ENZYMES  Recent Labs     04/08/22  1110   TROPONINI <0.01       U/A:    Lab Results   Component Value Date    COLORU Yellow 04/08/2022    CLARITYU Clear 04/08/2022    SPECGRAV <=1.005 04/08/2022    LEUKOCYTESUR Negative 04/08/2022    BLOODU Negative 04/08/2022    GLUCOSEU Negative 04/08/2022       CULTURES  COVID and Flu pending     EKG:  Sinus bradycardia, cannot rule out anterior infarct, age undetermined   -when compared with previous, similar reading    RADIOLOGY  XR CHEST (2 VW)   Final Result   No acute cardiopulmonary disease.                ASSESSMENT/PLAN:    #Chest pain   #Dyspnea   -labs unremarkable   -d-dimer negative   -CXR negative   -initial troponin negative  -EKG initially with sinus bradycardia, anterior infarct age undetermined (similar previous)    -trend troponins  -lipid panel pending    -echo pending   -repeat EKGs pending  -stress test pending for tomorrow, NPO after midnight   -ASA and statin   -nitro prn     #COVID and Flu pending    #Hx of VTEs  -provoked, post-partum, 17 yrs ago   -completed 3 months of anticoagulation      DVT Prophylaxis: lovenox   Diet: No diet orders on file  Code Status: No Order      RODERICK Pavon  04/08/22  2:24 PM

## 2022-04-08 NOTE — ED PROVIDER NOTES
Evaluated by Advanced Practice Provider          [facility]  [place]        Pt Name: Yane Tidwell  MRN: [id number]  [place] 1980  Dateof evaluation: 4/8/2022  Provider: MARIE Somers CNP  PCP: MARIE Espinosa CNP  ED Attending: No att. providers found    [address] [place] [address]       Chief Complaint   Patient presents with    Chest Pain     pt has squeezing pain in chest.  started last night.  has had a prior episode. scheduled to have stress test next week. sent here to be admitted to get test sooner. HISTORY OF PRESENTILLNESS   (Location/Symptom, Timing/Onset, Context/Setting, Quality, Duration, Modifying Factors, Severity)  Note limiting factors. Yane Tidwell is a 35 y.o. female for pain chest pain. Onset was yesterday. Context includes patient reports that she started having tightness in her chest and shortness of breath yesterday. Patient reports this is the third episode and after calling her primary care doctor she was encouraged to come to the ED to be admitted for stress test.  Patient reports that she is scheduled for a stress test and an echo next week with Dr. Damaris Rubio however since this is the third episode they recommended that she have this done sooner. Patient reports that her pain is constant and squeezing in nature. She does report that it is worse with a deep breath. Alleviating factors include nothing. Aggravating factors include nothing. Pain is 6/1o. 81 mg of aspirin has been used for pain today. Nursing Notes were all reviewed and agreed with or any disagreements were addressed  in the HPI. REVIEW OF SYSTEMS    (2-9 systems for level 4, 10 or more for level 5)     Review of Systems   Constitutional: Negative for activity change, appetite change and fever. HENT: Negative for congestion, facial swelling, rhinorrhea and sore throat. Eyes: Negative for visual disturbance.    Respiratory: Positive for chest tightness and shortness of breath. Cardiovascular: Positive for chest pain. Gastrointestinal: Negative for abdominal pain. Genitourinary: Negative for difficulty urinating. Musculoskeletal: Negative for arthralgias and myalgias. Skin: Negative for color change and rash. Neurological: Negative for dizziness and light-headedness. Psychiatric/Behavioral: Negative for agitation. All other systems reviewed and are negative. Positives and Pertinent negatives as per HPI. Except as noted above in the ROS, all other systems were reviewed and negative. PAST MEDICAL HISTORY     Past Medical History:   Diagnosis Date    DVT (deep venous thrombosis) ([facility] [address] 2005    RLE (2 concurrent clots); post-partum; was on Lovenox for 3 mo.  History of blood clots          SURGICAL HISTORY       Past Surgical History:   Procedure Laterality Date    NASAL ENDOSCOPY  12/23/13    nasal endoscopy, removal of foreign body    TONSILLECTOMY      WRIST SURGERY  1995    plate put in         [address]       Current Discharge Medication List      CONTINUE these medications which have NOT CHANGED    Details   loratadine (CLARITIN) 10 MG capsule Take 10 mg by mouth daily      aspirin 81 MG chewable tablet Take 81 mg by mouth daily. ALLERGIES     Patient has no known allergies.     FAMILY HISTORY       Family History   Problem Relation Age of Onset    Cancer Maternal Grandmother [de-identified]        lung CA spread to breast    Heart Disease Maternal Grandfather 58        CHF    Cancer Paternal Grandfather         prostate    Alzheimer's Disease Paternal Grandfather     Mult Sclerosis Mother     Breast Cancer Mother 54    Heart Disease Paternal Grandmother           SOCIAL HISTORY       Social History     Socioeconomic History    Marital status:      Spouse name: None    Number of children: None    Years of education: None    Highest education level: None   Occupational History    None Tobacco Use    Smoking status: Never Smoker    Smokeless tobacco: Never Used   Substance and Sexual Activity    Alcohol use: No    Drug use: No    Sexual activity: Yes     Partners: Male   Other Topics Concern    None   Social History Narrative    None     Social Determinants of Health     Financial Resource Strain:     Difficulty of Paying Living Expenses: Not on file   Food Insecurity:     Worried About Running Out of Food in the Last Year: Not on file    Joe of Food in the Last Year: Not on file   Transportation Needs:     Lack of Transportation (Medical): Not on file    Lack of Transportation (Non-Medical): Not on file   Physical Activity:     Days of Exercise per Week: Not on file    Minutes of Exercise per Session: Not on file   Stress:     Feeling of Stress : Not on file   Social Connections:     Frequency of Communication with Friends and Family: Not on file    Frequency of Social Gatherings with Friends and Family: Not on file    Attends Judaism Services: Not on file    Active Member of [address] [place] [address] or Organizations: Not on file    Attends Club or Organization Meetings: Not on file    Marital Status: Not on file   Intimate Partner Violence:     Fear of Current or Ex-Partner: Not on file    Emotionally Abused: Not on file    Physically Abused: Not on file    Sexually Abused: Not on file   Housing Stability:     Unable to Pay for Housing in the Last Year: Not on file    Number of [place] in the Last Year: Not on file    Unstable Housing in the Last Year: Not on file       SCREENINGS    [place] Coma Scale  Eye Opening: Spontaneous  Best Verbal Response: Oriented  Best Motor Response: Obeys commands  [place] Coma Scale Score: 15        PHYSICAL EXAM  (up to 7 for level 4, 8 or more for level 5)     ED Triage Vitals   BP Temp Temp src Pulse Resp SpO2 Height Weight   -- -- -- -- -- -- -- --       Physical Exam  Constitutional:       Appearance: She is well-developed.    HENT: Head: Normocephalic and atraumatic. Cardiovascular:      Rate and Rhythm: Normal rate. Pulmonary:      Effort: Pulmonary effort is normal. No respiratory distress. Abdominal:      General: There is no distension. Palpations: Abdomen is soft. Tenderness: There is no abdominal tenderness. Musculoskeletal:         General: Normal range of motion. Cervical back: Normal range of motion. Skin:     General: Skin is warm and dry. Neurological:      Mental Status: She is alert and oriented to person, place, and time. DIAGNOSTIC RESULTS   LABS:    Labs Reviewed   COMPREHENSIVE METABOLIC PANEL W/ REFLEX TO MG FOR LOW K - Abnormal; Notable for the following components:       Result Value    Albumin/Globulin Ratio 2.3 (*)     All other components within normal limits   COVID-19 & INFLUENZA COMBO   COVID-19 & INFLUENZA COMBO   CBC WITH AUTO DIFFERENTIAL   URINALYSIS WITH REFLEX TO CULTURE   BRAIN NATRIURETIC PEPTIDE   D-DIMER, QUANTITATIVE   TROPONIN   PROTIME-INR   HCG, SERUM, QUALITATIVE   TROPONIN   TROPONIN   CBC   COMPREHENSIVE METABOLIC PANEL W/ REFLEX TO MG FOR LOW K       All other labs werewithin normal range or not returned as of this dictation. EKG: All EKG's are interpreted by the Emergency Department Physician who either signs or Co-signs this chart in the absence of a cardiologist.  Please see their note for interpretation of EKG. RADIOLOGY:     Chest x-ray interpreted by radiologist for    Impression:    No acute cardiopulmonary disease. Interpretation per the Radiologist below, if available at the time of this note:    XR CHEST (2 VW)   Final Result   No acute cardiopulmonary disease. NM Cardiac Stress Test Nuclear Imaging    (Results Pending)     No results found.       PROCEDURES   Unless otherwise noted below, none     Procedures     CRITICAL CARE TIME   N/A    CONSULTS:  IP CONSULT TO HOSPITALIST      EMERGENCYDEPARTMENT COURSE and DIFFERENTIAL DIAGNOSIS/MDM:   Vitals:    Vitals:    04/08/22 1155 04/08/22 1203 04/08/22 1232 04/08/22 1423   BP: 113/74 117/73 127/70 117/77   Pulse: 50 51 62 53   Resp: 13 11 12 16   Temp:    97.6 °F (36.4 °C)   TempSrc:    Oral   SpO2: 98% 98% 100% 99%   Weight:    157 lb (71.2 kg)   Height:    5' 4\" (1.626 m)       Patient was given the following medications:  Medications   nitroGLYCERIN (NITROSTAT) SL tablet 0.4 mg (0.4 mg SubLINGual Given 4/8/22 1308)   aspirin chewable tablet 81 mg (has no administration in time range)   cetirizine (ZYRTEC) tablet 10 mg (10 mg Oral Not Given 4/8/22 1436)   sodium chloride flush 0.9 % injection 5-40 mL (has no administration in time range)   sodium chloride flush 0.9 % injection 5-40 mL (has no administration in time range)   0.9 % sodium chloride infusion (has no administration in time range)   ondansetron (ZOFRAN-ODT) disintegrating tablet 4 mg (has no administration in time range)     Or   ondansetron (ZOFRAN) injection 4 mg (has no administration in time range)   acetaminophen (TYLENOL) tablet 650 mg (has no administration in time range)     Or   acetaminophen (TYLENOL) suppository 650 mg (has no administration in time range)   polyethylene glycol (GLYCOLAX) packet 17 g (has no administration in time range)   enoxaparin (LOVENOX) injection 40 mg (40 mg SubCUTAneous Given 4/8/22 1500)   potassium chloride (KLOR-CON M) extended release tablet 40 mEq (has no administration in time range)     Or   potassium bicarb-citric acid (EFFER-K) effervescent tablet 40 mEq (has no administration in time range)     Or   potassium chloride 10 mEq/100 mL IVPB (Peripheral Line) (has no administration in time range)   atorvastatin (LIPITOR) tablet 40 mg (has no administration in time range)   perflutren lipid microspheres (DEFINITY) injection 1.65 mg (has no administration in time range)   aspirin chewable tablet 162 mg (162 mg Oral Given 4/8/22 1115)   0.9 % sodium chloride bolus (0 mLs IntraVENous

## 2022-04-08 NOTE — ED TRIAGE NOTES
Chief Complaint   Patient presents with    Chest Pain     pt has squeezing pain in chest.  started last night.  has had a prior episode. scheduled to have stress test next week. sent here to be admitted to get test sooner.

## 2022-04-09 ENCOUNTER — APPOINTMENT (OUTPATIENT)
Dept: CT IMAGING | Age: 42
End: 2022-04-09
Payer: COMMERCIAL

## 2022-04-09 ENCOUNTER — APPOINTMENT (OUTPATIENT)
Dept: NUCLEAR MEDICINE | Age: 42
End: 2022-04-09
Payer: COMMERCIAL

## 2022-04-09 VITALS
OXYGEN SATURATION: 96 % | BODY MASS INDEX: 26.8 KG/M2 | SYSTOLIC BLOOD PRESSURE: 129 MMHG | HEART RATE: 55 BPM | DIASTOLIC BLOOD PRESSURE: 86 MMHG | WEIGHT: 157 LBS | HEIGHT: 64 IN | RESPIRATION RATE: 14 BRPM | TEMPERATURE: 97.5 F

## 2022-04-09 LAB
A/G RATIO: 2.9 (ref 1.1–2.2)
ALBUMIN SERPL-MCNC: 4 G/DL (ref 3.4–5)
ALP BLD-CCNC: 58 U/L (ref 40–129)
ALT SERPL-CCNC: 11 U/L (ref 10–40)
ANION GAP SERPL CALCULATED.3IONS-SCNC: 10 MMOL/L (ref 3–16)
AST SERPL-CCNC: 14 U/L (ref 15–37)
BILIRUB SERPL-MCNC: <0.2 MG/DL (ref 0–1)
BUN BLDV-MCNC: 8 MG/DL (ref 7–20)
CALCIUM SERPL-MCNC: 8.8 MG/DL (ref 8.3–10.6)
CHLORIDE BLD-SCNC: 107 MMOL/L (ref 99–110)
CO2: 23 MMOL/L (ref 21–32)
CREAT SERPL-MCNC: 0.7 MG/DL (ref 0.6–1.1)
EKG ATRIAL RATE: 49 BPM
EKG DIAGNOSIS: NORMAL
EKG P AXIS: 54 DEGREES
EKG P-R INTERVAL: 148 MS
EKG Q-T INTERVAL: 478 MS
EKG QRS DURATION: 82 MS
EKG QTC CALCULATION (BAZETT): 431 MS
EKG R AXIS: 56 DEGREES
EKG T AXIS: 48 DEGREES
EKG VENTRICULAR RATE: 49 BPM
GFR AFRICAN AMERICAN: >60
GFR NON-AFRICAN AMERICAN: >60
GLUCOSE BLD-MCNC: 89 MG/DL (ref 70–99)
HCT VFR BLD CALC: 35 % (ref 36–48)
HEMOGLOBIN: 11.7 G/DL (ref 12–16)
LV EF: 58 %
LV EF: 75 %
LVEF MODALITY: NORMAL
LVEF MODALITY: NORMAL
MCH RBC QN AUTO: 27.2 PG (ref 26–34)
MCHC RBC AUTO-ENTMCNC: 33.3 G/DL (ref 31–36)
MCV RBC AUTO: 81.7 FL (ref 80–100)
PDW BLD-RTO: 14.4 % (ref 12.4–15.4)
PLATELET # BLD: 173 K/UL (ref 135–450)
PMV BLD AUTO: 9.1 FL (ref 5–10.5)
POTASSIUM REFLEX MAGNESIUM: 3.9 MMOL/L (ref 3.5–5.1)
RBC # BLD: 4.29 M/UL (ref 4–5.2)
SODIUM BLD-SCNC: 140 MMOL/L (ref 136–145)
TOTAL PROTEIN: 5.4 G/DL (ref 6.4–8.2)
TROPONIN: <0.01 NG/ML
WBC # BLD: 3.8 K/UL (ref 4–11)

## 2022-04-09 PROCEDURE — 6360000004 HC RX CONTRAST MEDICATION: Performed by: INTERNAL MEDICINE

## 2022-04-09 PROCEDURE — 99205 OFFICE O/P NEW HI 60 MIN: CPT | Performed by: INTERNAL MEDICINE

## 2022-04-09 PROCEDURE — 3430000000 HC RX DIAGNOSTIC RADIOPHARMACEUTICAL: Performed by: NURSE PRACTITIONER

## 2022-04-09 PROCEDURE — G0378 HOSPITAL OBSERVATION PER HR: HCPCS

## 2022-04-09 PROCEDURE — 93005 ELECTROCARDIOGRAM TRACING: CPT | Performed by: NURSE PRACTITIONER

## 2022-04-09 PROCEDURE — 99238 HOSP IP/OBS DSCHRG MGMT 30/<: CPT | Performed by: INTERNAL MEDICINE

## 2022-04-09 PROCEDURE — 36415 COLL VENOUS BLD VENIPUNCTURE: CPT

## 2022-04-09 PROCEDURE — 6370000000 HC RX 637 (ALT 250 FOR IP): Performed by: NURSE PRACTITIONER

## 2022-04-09 PROCEDURE — 78452 HT MUSCLE IMAGE SPECT MULT: CPT

## 2022-04-09 PROCEDURE — 96360 HYDRATION IV INFUSION INIT: CPT

## 2022-04-09 PROCEDURE — 93306 TTE W/DOPPLER COMPLETE: CPT

## 2022-04-09 PROCEDURE — 80053 COMPREHEN METABOLIC PANEL: CPT

## 2022-04-09 PROCEDURE — 93010 ELECTROCARDIOGRAM REPORT: CPT | Performed by: INTERNAL MEDICINE

## 2022-04-09 PROCEDURE — 85027 COMPLETE CBC AUTOMATED: CPT

## 2022-04-09 PROCEDURE — 2580000003 HC RX 258: Performed by: NURSE PRACTITIONER

## 2022-04-09 PROCEDURE — 84484 ASSAY OF TROPONIN QUANT: CPT

## 2022-04-09 PROCEDURE — A9502 TC99M TETROFOSMIN: HCPCS | Performed by: NURSE PRACTITIONER

## 2022-04-09 PROCEDURE — 93017 CV STRESS TEST TRACING ONLY: CPT

## 2022-04-09 PROCEDURE — 71275 CT ANGIOGRAPHY CHEST: CPT

## 2022-04-09 RX ADMIN — CETIRIZINE HYDROCHLORIDE 10 MG: 10 TABLET ORAL at 13:01

## 2022-04-09 RX ADMIN — TETROFOSMIN 33.1 MILLICURIE: 1.38 INJECTION, POWDER, LYOPHILIZED, FOR SOLUTION INTRAVENOUS at 09:05

## 2022-04-09 RX ADMIN — Medication 10 ML: at 13:02

## 2022-04-09 RX ADMIN — ASPIRIN 81 MG 81 MG: 81 TABLET ORAL at 13:01

## 2022-04-09 RX ADMIN — IOPAMIDOL 85 ML: 755 INJECTION, SOLUTION INTRAVENOUS at 10:30

## 2022-04-09 RX ADMIN — TETROFOSMIN 11.3 MILLICURIE: 1.38 INJECTION, POWDER, LYOPHILIZED, FOR SOLUTION INTRAVENOUS at 07:30

## 2022-04-09 NOTE — PROGRESS NOTES
A Myoview stress test was completed on this patient as ordered. The patient tolerated the procedure well. Awaiting stress imaging at this time.

## 2022-04-09 NOTE — CONSULTS
[facility]   Cardiac Evaluation      Patient: Coltilde Mitchell  YOB: 1980         Chief Complaint   Patient presents with    Chest Pain     pt has squeezing pain in chest.  started last night.  has had a prior episode. scheduled to have stress test next week. sent here to be admitted to get test sooner. Referring provider: MARIE Conner CNP    History of Present Illness:   36 yo WF admitted with 3 episodes of anterior chest pain radiating to the left arm which always occur at rest. Trops are normal. EKG is SB with no ischemia. She has had these while working as a  and just tries to work through this. She has no exertional symptoms and is able to walk the stairs at school without difficulty. Nonsmoker She had an echo this am which shows a sessile mass which appears to be in the main pulmonary artery or ELIZABETH. No hx of AF. Hx of DVT in the distant past while pregnant but now only on ASA. Echo was otherwise normal.  GXt was done this am which showed normal MV images. Past Medical History:   has a past medical history of DVT (deep venous thrombosis) ([facility]) and History of blood clots. Surgical History:   has a past surgical history that includes Wrist surgery (1995); Tonsillectomy; and nasal endoscopy (12/23/13).      Current Facility-Administered Medications   Medication Dose Route Frequency Provider Last Rate Last Admin    nitroGLYCERIN (NITROSTAT) SL tablet 0.4 mg  0.4 mg SubLINGual Q5 Min PRN MARIE Burns - CNP   0.4 mg at 04/08/22 1308    aspirin chewable tablet 81 mg  81 mg Oral Daily Medina Oates APRN - CNP        cetirizine (ZYRTEC) tablet 10 mg  10 mg Oral Daily Medina , APRN - CNP        sodium chloride flush 0.9 % injection 5-40 mL  5-40 mL IntraVENous 2 times per day MARIE Burns - CNP   10 mL at 04/08/22 2049    sodium chloride flush 0.9 % injection 5-40 mL  5-40 mL IntraVENous PRN Medina Oates APRROGER - CNP  0.9 % sodium chloride infusion   IntraVENous PRN Kinga Cobble, APRN - CNP        ondansetron (ZOFRAN-ODT) disintegrating tablet 4 mg  4 mg Oral Q8H PRN Kinga Cobble, APRN - CNP        Or    ondansetron [facility]) injection 4 mg  4 mg IntraVENous Q6H PRN Kinga Cobble, APRN - CNP        acetaminophen (TYLENOL) tablet 650 mg  650 mg Oral Q6H PRN Kinga Cobble, APRN - CNP        Or    acetaminophen (TYLENOL) suppository 650 mg  650 mg Rectal Q6H PRN Kinga Cobble, APRN - CNP        polyethylene glycol (GLYCOLAX) packet 17 g  17 g Oral Daily PRN Kinga Cobble, APRN - CNP        enoxaparin (LOVENOX) injection 40 mg  40 mg SubCUTAneous Daily Kinga Cobble, APRN - CNP   40 mg at 04/08/22 1500    potassium chloride (KLOR-CON M) extended release tablet 40 mEq  40 mEq Oral PRN Kinga Cobble, APRN - CNP        Or    potassium bicarb-citric acid (EFFER-K) effervescent tablet 40 mEq  40 mEq Oral PRN Kinga Cobble, APRN - CNP        Or    potassium chloride 10 mEq/100 mL IVPB (Peripheral Line)  10 mEq IntraVENous PRN Kinga Cobble, APRN - CNP        atorvastatin (LIPITOR) tablet 40 mg  40 mg Oral Nightly Kinga Cobble, APRN - CNP   40 mg at 04/08/22 2048    perflutren lipid microspheres (DEFINITY) injection 1.65 mg  1.5 mL IntraVENous ONCE PRN RODERICK Russell           Allergies:  Patient has no known allergies.      Social History:  Social History     Socioeconomic History    Marital status:      Spouse name: Not on file    Number of children: Not on file    Years of education: Not on file    Highest education level: Not on file   Occupational History    Not on file   Tobacco Use    Smoking status: Never Smoker    Smokeless tobacco: Never Used   Substance and Sexual Activity    Alcohol use: No    Drug use: No    Sexual activity: Yes     Partners: Male   Other Topics Concern    Not on file   Social History Narrative    Not on file     Social Determinants of Health     Financial Resource Strain:     Difficulty of Paying Living Expenses: Not on file   Food Insecurity:     Worried About Running Out of Food in the Last Year: Not on file    Ran Out of Food in the Last Year: Not on file   Transportation Needs:     Lack of Transportation (Medical): Not on file    Lack of Transportation (Non-Medical): Not on file   Physical Activity:     Days of Exercise per Week: Not on file    Minutes of Exercise per Session: Not on file   Stress:     Feeling of Stress : Not on file   Social Connections:     Frequency of Communication with Friends and Family: Not on file    Frequency of Social Gatherings with Friends and Family: Not on file    Attends Synagogue Services: Not on file    Active Member of [address] [place] [address] or Organizations: Not on file    Attends Club or Organization Meetings: Not on file    Marital Status: Not on file   Intimate Partner Violence:     Fear of Current or Ex-Partner: Not on file    Emotionally Abused: Not on file    Physically Abused: Not on file    Sexually Abused: Not on file   Housing Stability:     Unable to Pay for Housing in the Last Year: Not on file    Number of [place] in the Last Year: Not on file    Unstable Housing in the Last Year: Not on file       Family History:   Family History   Problem Relation Age of Onset    Cancer Maternal Grandmother [de-identified]        lung CA spread to breast    Heart Disease Maternal Grandfather 58        CHF    Cancer Paternal Grandfather         prostate    Alzheimer's Disease Paternal Grandfather     Mult Sclerosis Mother     Breast Cancer Mother 54    Heart Disease Paternal Grandmother      Family history has been reviewed and not pertinent except as noted above. Review of Systems:   · Constitutional: there has been no unanticipated weight loss. No change in energy or activity level   · Eyes: No visual changes   · ENT: No Headaches, hearing loss or vertigo. No mouth sores or sore throat.   · Cardiovascular: Reviewed in HPI  · Respiratory: No cough or wheezing, no sputum production. · Gastrointestinal: No abdominal pain, appetite loss, blood in stools. No change in bowel or bladder habits. · Genitourinary: No nocturia, dysuria, trouble voiding  · Musculoskeletal:  No gait disturbance, weakness or joint complaints. · Integumentary: No rash or pruritis. · Neurological: No headache, change in muscle strength, numbness or tingling. No change in gait, balance, coordination, mood, affect, memory, mentation, behavior. · Psychiatric: No anxiety or depression  · Endocrine: No malaise or fever  · Hematologic/Lymphatic: No abnormal bruising or bleeding, blood clots or swollen lymph nodes. · Allergic/Immunologic: No nasal congestion or hives. Physical Examination:    Vitals:    04/08/22 1423 04/08/22 2045 04/09/22 0045 04/09/22 0718   BP: 117/77 114/72 96/60 118/76   Pulse: 53 60 65 52   Resp: 16 14 14 16   Temp: 97.6 °F (36.4 °C) 97.4 °F (36.3 °C) 98.1 °F (36.7 °C) 97.8 °F (36.6 °C)   TempSrc: Oral Oral Oral Oral   SpO2: 99% 96% 97% 96%   Weight: 157 lb (71.2 kg)  157 lb (71.2 kg)    Height: 5' 4\" (1.626 m)        Body mass index is 26.95 kg/m². Wt Readings from Last 3 Encounters:   04/09/22 157 lb (71.2 kg)   03/29/22 156 lb (70.8 kg)   12/11/20 156 lb (70.8 kg)      BP Readings from Last 3 Encounters:   04/09/22 118/76   03/29/22 104/70   03/08/22 127/83        Physical Examination:    · CONSTITUTIONAL: Well developed, well nourished  · EYES: PERRLA. No xanthelasma, sclera non icteric  · EARS,NOSE,MOUTH,THROAT:  Mucous membranes moist, normal hearing  · NECK: Supple, JVP normal, thyroid not enlarged. Carotids 2+ without bruits  · RESPIRATORY: Normal effort, no rales or rhonchi  · CARDIOVASCULAR: Normal PMI, regular rate and rhythm, no murmurs, rub or gallop. No edema. Radial pulses present and equal  · CHEST: No scar or masses  · ABDOMEN: Normal bowel sounds. No masses or tenderness. No bruit  · MUSCULOSKELETAL: No clubbing or cyanosis.  Moves all extremities well. Normal gait  · SKIN:  Warm and dry. No rashes  · NEUROLOGIC: Cranial nerves intact. Alert and oriented  · PSYCHIATRIC: Calm affect. Appears to have normal judgement and insight        Assessment/Plan  1. Chest pain, unspecified type - her chest pain is very atypical for CAD. Her MV is normal. Her ddimer is normal. CTA has been performed (repeated and no mass is seen in the pulmonary artery. 2. Abnormal Echocardiogram. - unusual mass seen in the area of the central PA but not seen on contrast CTA. She will probably need a ALPHONSO and or MRI of the heart for further delineation. These studies could be done as an OP. I will discuss f/u with DR Víctor Tavera for her. Thank you for allowing to me to participate in the care of Noemy Mcghee.

## 2022-04-09 NOTE — PROGRESS NOTES
PM assessment completed. Scheduled medications given per MAR. VSS room air, A/O x4,  denies any needs at this time. Call light in reach, will monitor.

## 2022-04-09 NOTE — DISCHARGE SUMMARY
Name:  Ramesh Sahu  Room:  /7168-13  MRN:    [id number]    Discharge Summary      This discharge summary is in conjunction with a complete physical exam done on the day of discharge. Discharging Physician: Lydia Aguilar MD      Admit: 4/8/2022  Discharge:   4/9/2022     Diagnoses this Admission    Principal Problem:    Chest pain  Resolved Problems:    * No resolved hospital problems. *          Procedures (Please Review Full Report for Details)      Consults    IP CONSULT TO HOSPITALIST  IP CONSULT TO CARDIOLOGY      HPI:        Physical Exam at Discharge:  /86   Pulse 55   Temp 97.5 °F (36.4 °C) (Oral)   Resp 14   Ht 5' 4\" (1.626 m)   Wt 157 lb (71.2 kg)   SpO2 96%   BMI 26.95 kg/m²         Hospital Course      CBC:   Recent Labs     04/08/22  1110 04/09/22  0443   WBC 4.0 3.8*   HGB 12.2 11.7*   HCT 36.7 35.0*   MCV 81.8 81.7    173     BMP:   Recent Labs     04/08/22  1110 04/09/22  0443    140   K 4.0 3.9    107   CO2 26 23   BUN 10 8   CREATININE 0.7 0.7     LIVER PROFILE:   Recent Labs     04/08/22  1110 04/09/22  0443   AST 16 14*   ALT 13 11   BILITOT <0.2 <0.2   ALKPHOS 67 58     PT/INR:   Recent Labs     04/08/22  1110   PROTIME 11.0   INR 0.97     APTT: No results for input(s): APTT in the last 72 hours. UA:  Recent Labs     04/08/22  1309   COLORU Yellow   PHUR 6.0   CLARITYU Clear   SPECGRAV <=1.005   LEUKOCYTESUR Negative   UROBILINOGEN 0.2   BILIRUBINUR Negative   BLOODU Negative   GLUCOSEU Negative           CTA PULMONARY W CONTRAST   Final Result   1. No findings of pulmonary embolism. 2. Reflux of the contrast bolus that can be seen with right heart dysfunction   or as normal variation. 3. Minimal to mild bronchial wall thickening potentially due to pulmonary   vascular congestion, reactive airways disease, or bronchitis.          NM Cardiac Stress Test Nuclear Imaging   Final Result      XR CHEST (2 VW)   Final Result   No acute cardiopulmonary disease. Discharge Medications     Medication List      ASK your doctor about these medications    aspirin 81 MG chewable tablet     Claritin 10 MG capsule  Generic drug: loratadine              Discharge Condition/Location: Stable    Follow Up: Follow up with PCP and cardiology.         Hasmukh Morrow MD 4/9/2022 12:07 PM

## 2022-04-09 NOTE — FLOWSHEET NOTE
04/09/22 0718   Vital Signs   Temp 97.8 °F (36.6 °C)   Temp Source Oral   Pulse 52   Heart Rate Source Monitor   Resp 16   /76   BP Location Right upper arm   Patient Position Semi fowlers   Level of Consciousness Alert (0)   MEWS Score 1   Patient Currently in Pain Denies   Oxygen Therapy   SpO2 96 %   Pulse Oximeter Device Mode Intermittent   Pulse Oximeter Device Location Finger   O2 Device None (Room air)   Patient down to stress lab at this time.

## 2022-04-09 NOTE — PROGRESS NOTES
Patient educated on discharge instructions as well as new medications use, dosage, administration and possible side effects. Patient verified knowledge. IV removed without difficulty and dry dressing in place. Telemetry monitor removed and returned to Nellie Desai. Pt left facility in stable condition to Home with all of their personal belongings.

## 2022-04-09 NOTE — FLOWSHEET NOTE
04/09/22 1057   Vital Signs   Temp 97.5 °F (36.4 °C)   Temp Source Oral   Pulse 55   Heart Rate Source Monitor   Resp 14   /86   BP Location Right upper arm   Patient Position Semi fowlers   Level of Consciousness Alert (0)   MEWS Score 0   Patient Currently in Pain Denies   Oxygen Therapy   SpO2 96 %   Pulse Oximeter Device Mode Intermittent   Pulse Oximeter Device Location Finger   O2 Device None (Room air)   Patient returned from stress and [address] notified. Patient is resting showing no s/s of distress. Patient is alert and oriented. Meds were HELD until further instruction, see MAR. Patient is denying any needs. Bed is in lowest position and call light is within reach. Will continue to monitor. Shift assessment complete, see flowsheets.

## 2022-04-11 NOTE — DISCHARGE SUMMARY
Name:  Junior Villanueva  Room:  /1367-55  MRN:    [id number]    Discharge Summary      This discharge summary is in conjunction with a complete physical exam done on the day of discharge. Discharging Physician: Dr. Joshua Cam: 4/8/2022  Discharge:  4/9/2022    HPI taken from admission H&P:       The patient is a 35 y.o. female with pmhx of VTEs who presented to [facility] ED with complaint of chest pain and shortness of breath that began last night. Pain feels like a squeezing in the chest that radiates up the left shoulder and around her shoulder blade, pain comes and goes. Shortness of breath is sudden and fleeting with chest pain, worse with exertion. Pt has had 2 other similar episodes with same characteristics over the past year. Each time, she begins to feel light-headed and dizzy but no syncope. Pt also reports elevated BP readings with each episode and diaphoresis. PCP encouraged her to come to ED to get stress test. Pt was scheduled for stress test and echo next week with Dr. Lester Lee. Pain was 6/10 now down to 4/10 after nitro. Pt takes baby ASA at home, no other medications. No other health conditions. 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 Does have + family hx of heart disease. No fever, chills, cough, n/v/d, hematuria, dysuria.      CBC- hemodynamically stable. Labs unremarkable. D-dimer negative. Initial Troponin negative. EKG final report pending, but with sinus bradycardia, cannot rule out anterior infarct, age undetermined (similar reading when compared with last EKG) CXR negative. IVF, nitro, and ASA given. Diagnoses this Admission and Hospital Course     #Chest pain   #Dyspnea   -labs all unremarkable  -CXR and CTA negative   -trops negative   -stress test negative   -echo revealed mass on central PA, cardiology recommending ALPHONSO or MRI outpatient.   Chest CT did not show any mass  -continue ASA         #Hx of VTEs  -provoked, post-partum, 17 yrs ago   -completed 3 months of anticoagulation         Procedures (Please Review Full Report for Details)  N/A    Consults    Cardiology       Physical Exam at Discharge:    /86   Pulse 55   Temp 97.5 °F (36.4 °C) (Oral)   Resp 14   Ht 5' 4\" (1.626 m)   Wt 157 lb (71.2 kg)   SpO2 96%   BMI 26.95 kg/m²   Gen: No distress. Alert. Eyes: PERRL. No sclera icterus. No conjunctival injection. Neck: No JVD. No Carotid Bruit. Trachea midline. Resp: No accessory muscle use. No crackles. No wheezes. No rhonchi. CV: Regular rate. Regular rhythm. No murmur. No rub. No edema. Peripheral Pulses: +2 palpable, equal bilaterally   GI: Non-tender. Non-distended. No masses. No organomegaly. Normal bowel sounds. No hernia. Skin: Warm and dry. No nodule on exposed extremities. No rash on exposed extremities. M/S: No cyanosis. No joint deformity. No clubbing. Neuro: Awake. Grossly nonfocal    Psych: Oriented x 3. No anxiety or agitation.        Lab Results   Component Value Date    WBC 3.8 (L) 04/09/2022    HGB 11.7 (L) 04/09/2022    HCT 35.0 (L) 04/09/2022    MCV 81.7 04/09/2022     04/09/2022     Lab Results   Component Value Date     04/09/2022    K 3.9 04/09/2022     04/09/2022    CO2 23 04/09/2022    BUN 8 04/09/2022    CREATININE 0.7 04/09/2022    GLUCOSE 89 04/09/2022    CALCIUM 8.8 04/09/2022    PROT 5.4 (L) 04/09/2022    LABALBU 4.0 04/09/2022    BILITOT <0.2 04/09/2022    ALKPHOS 58 04/09/2022    AST 14 (L) 04/09/2022    ALT 11 04/09/2022    LABGLOM >60 04/09/2022    GFRAA >60 04/09/2022    AGRATIO 2.9 (H) 04/09/2022    GLOB 2.6 10/14/2021       Lab Results   Component Value Date    DDIMER <200 04/08/2022       CULTURES  COVID and Flu not detected     RADIOLOGY  NM Cardiac Stress Test Nuclear Imaging   Final Result      CTA PULMONARY W CONTRAST   Final Result   1. No findings of pulmonary embolism.    2. Reflux of the contrast bolus that can be seen with right heart dysfunction   or as normal variation. 3. Minimal to mild bronchial wall thickening potentially due to pulmonary   vascular congestion, reactive airways disease, or bronchitis. XR CHEST (2 VW)   Final Result   No acute cardiopulmonary disease. ECHO 4/9/22  Conclusions      Summary   Left ventricular systolic function is normal with ejection fraction   estimated at 55-60 %. No regional wall motion abnormalities are noted. Normal left ventricular diastolic filling pressure. Mild mitral regurgitation. Mild tricuspid regurgitation. Normal systolic pulmonary artery pressure (SPAP) estimated at 25 mmHg (RA   pressure 3 mmHg). Mild pulmonic regurgitation present. There is an mobile echogenic structure, of uncertain origin, visualized   in the PSAX in the region of the pulmonary artery vs possible left atrial   appendage. Signature      ------------------------------------------------------------------   Electronically signed by Odalis Pierce MD (Interpreting   physician) on 04/09/2022 at 09:59 AM   ------------------------------------------------------------------        Discharge Medications     Medication List      CONTINUE taking these medications    aspirin 81 MG chewable tablet     Claritin 10 MG capsule  Generic drug: loratadine              Discharged in stable condition to home    Follow Up: Follow up with PCP in 1 week.   See cardiology as outpatient      Caitlin Villegas MD 4/15/2022 11:17 AM

## 2022-04-12 ENCOUNTER — OFFICE VISIT (OUTPATIENT)
Dept: CARDIOLOGY CLINIC | Age: 42
End: 2022-04-12
Payer: COMMERCIAL

## 2022-04-12 VITALS
BODY MASS INDEX: 26.46 KG/M2 | OXYGEN SATURATION: 99 % | WEIGHT: 155 LBS | HEIGHT: 64 IN | DIASTOLIC BLOOD PRESSURE: 70 MMHG | SYSTOLIC BLOOD PRESSURE: 116 MMHG | HEART RATE: 67 BPM

## 2022-04-12 DIAGNOSIS — I51.89 CARDIAC MASS: Primary | ICD-10-CM

## 2022-04-12 DIAGNOSIS — R07.89 OTHER CHEST PAIN: ICD-10-CM

## 2022-04-12 DIAGNOSIS — Z86.718 HISTORY OF DVT (DEEP VEIN THROMBOSIS): ICD-10-CM

## 2022-04-12 PROCEDURE — G8427 DOCREV CUR MEDS BY ELIG CLIN: HCPCS | Performed by: INTERNAL MEDICINE

## 2022-04-12 PROCEDURE — G8419 CALC BMI OUT NRM PARAM NOF/U: HCPCS | Performed by: INTERNAL MEDICINE

## 2022-04-12 PROCEDURE — 99214 OFFICE O/P EST MOD 30 MIN: CPT | Performed by: INTERNAL MEDICINE

## 2022-04-12 PROCEDURE — 1036F TOBACCO NON-USER: CPT | Performed by: INTERNAL MEDICINE

## 2022-04-12 NOTE — PROGRESS NOTES
[facility]   Cardiac Consultation    Referring Provider:  MARIE Oliveros - CNP     Chief Complaint   Patient presents with    New Patient    Chest Pain    Shortness of Breath      SUBJECTIVE: Patient is here to establish cardiac care for evaluation of chest pain. c/o chest pain and SOB today. History of Present Illness:  Ms. Samira Ring is 35 y.o. female with PMH DVT RLE (2 clots same time in RLE post-partum---took lovenox x 3 months). No cardiac history. . Patient presented to the ED for chest pain at rest 4/8/2022. Consulted by Dr. Jazmín Fischer. Ruled out for MI. She reports 3 visits to ED for CP 3 times since 10/2021. Patient underwent GXT myoview stress test 4/9/2022 LVEF of 75%;normal myocardial perfusion with abnormal EKG response. Echo 4/9/2022 LVEF of 55-60%,There is an mobile echogenic structure, of uncertain origin, visualized in the PSAX in the region of the pulmonary artery vs possible left atrial appendage. ECG 4/8/2022 demonstrated sinus bradycardia with a heart rate of 49 BPM. Note CTPA 4/9/22 negative for PE. Today, she reports CP is mid-sternal, squeezing, radiated to left arm, and assoc with diaphoresis and SOB. Occurs at rest. No exertional CP or SOB and is active in daily life. She does report she has noticed her BP being elevated during her episodes of CP (160 mmHg). 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 Patient is taking all cardiac medications as prescribed and tolerates them well. Patient denies current CP, edema, palpitations, dizziness or syncope. Past Medical History:   has a past medical history of DVT (deep venous thrombosis) ([facility]) and History of blood clots. Surgical History:   has a past surgical history that includes Wrist surgery (1995); Tonsillectomy; and nasal endoscopy (12/23/13). Social History:   reports that she has never smoked. She has never used smokeless tobacco. She reports that she does not drink alcohol and does not use drugs. Family History:  family history includes Alzheimer's Disease in her paternal grandfather; Breast Cancer (age of onset: 54) in her mother; Cancer in her paternal grandfather; Cancer (age of onset: [de-identified]) in her maternal grandmother; Heart Disease in her paternal grandmother; Heart Disease (age of onset: 58) in her maternal grandfather; Mult Sclerosis in her mother. Home Medications:  Prior to Admission medications    Medication Sig Start Date End Date Taking? Authorizing Provider   loratadine (CLARITIN) 10 MG capsule Take 10 mg by mouth daily   Yes Historical Provider, MD   aspirin 81 MG chewable tablet Take 81 mg by mouth daily. Yes Historical Provider, MD        Allergies:  Patient has no known allergies. Review of Systems:   · Constitutional: there has been no unanticipated weight loss. There's been no change in energy level, sleep pattern, or activity level. · Eyes: No visual changes or diplopia. No scleral icterus. · ENT: No Headaches, hearing loss or vertigo. No mouth sores or sore throat. · Cardiovascular: Reviewed in HPI  · Respiratory: No cough or wheezing, no sputum production. No hematemesis. · Gastrointestinal: No abdominal pain, appetite loss, blood in stools. No change in bowel or bladder habits. · Genitourinary: No dysuria, trouble voiding, or hematuria. · Musculoskeletal:  No gait disturbance, weakness or joint complaints. · Integumentary: No rash or pruritis. · Neurological: No headache, diplopia, change in muscle strength, numbness or tingling. No change in gait, balance, coordination, mood, affect, memory, mentation, behavior. · Psychiatric: No anxiety, no depression. · Endocrine: No malaise, fatigue or temperature intolerance. No excessive thirst, fluid intake, or urination. No tremor. · Hematologic/Lymphatic: No abnormal bruising or bleeding, blood clots or swollen lymph nodes. · Allergic/Immunologic: No nasal congestion or hives.     Physical Examination:    Vitals: 04/12/22 1211   BP: 116/70   Pulse: 67   SpO2: 99%        Constitutional and General Appearance: NAD   Respiratory:  · Normal excursion and expansion without use of accessory muscles  · Resp Auscultation: Normal breath sounds without dullness  Cardiovascular:  · The apical impulses not displaced  · Heart tones are crisp and normal  · Cervical veins are not engorged  · The carotid upstroke is normal in amplitude and contour without delay or bruit  · Normal S1S2, No S3, No Murmur  · Peripheral pulses are symmetrical and full  · There is no clubbing, cyanosis of the extremities. · No edema  · Femoral Arteries: 2+ and equal  · Pedal Pulses: 2+ and equal   Abdomen:  · No masses; no tenderness  · Liver/Spleen: No Abnormalities Noted  Neurological/Psychiatric:  · Alert and oriented in all spheres  · Moves all extremities well  · Exhibits normal gait balance and coordination  · No abnormalities of mood, affect, memory, mentation, or behavior are noted  Skin:  · Skin: warm and dry. Assessment:   1. Possible cardiac mass:  I reviewed ECHO and there is suspicious mass which appears in pulmonary artery after pulmonic valve. No obvious intracardiac mass noted. Discrepant findings given CTPA did not show any mass or abnormal finding in pulmonary artery or lung. I will send for cardiac MRI to help clarify if real finding or possible artifact. 2. History of DVT (deep vein thrombosis): Occurred post-partum with 2 clots in RLE. Took lovenox for 3 months for [facility], [place] and no issues since then. She was placed on baby aspirin which she has taken for last several years. 3. Other chest pain: Unspecified. Not certain of etiology. She ruled out for MI and no ischemic ST changes. No CAD RF's and nuclear imaging myocardial perfusion normal. Will await cardiac MRI results and make further recs accordingly. Will consider cardiac CTA. Plan:  1. Cardiac MRI at [facility] ASAP.     -Needs to be done on a Thursday or a Friday to be read by Dr. Alonso Gong. 2. Follow up to be determined based on results of testing. 3. Continue baby aspirin daily. Cost of prescription medications and patient compliance have been reviewed with patient. All questions answered. Thank you for allowing me to participate in the care of this individual.    This note has been scribed in the presence of Anne Crook MD, by Larry Pavon RN.     I, Dr. Anne Crook, personally performed the services described in this documentation, as scribed by the above signed scribe in my presence. It is both accurate and complete to my knowledge. I agree with the details independently gathered by the clinical support staff, while the remaining scribed note accurately describes my personal service to the patient. Peter Landau.  Junior Butler M.D., [facility] [place]

## 2022-04-12 NOTE — PATIENT INSTRUCTIONS
Plan:  1. Cardiac MRI at [facility] ASAP. -Needs to be done on a Thursday or a Friday to be read by Dr. Lianna Mcnulty. 2. Follow up to be determined based on results of testing. Your provider has ordered testing for further evaluation. An order/prescription has been included in your paper work.  To schedule outpatient testing, contact Central Scheduling by calling [address] [place] [address] ([phone]).

## 2022-04-14 ENCOUNTER — TELEPHONE (OUTPATIENT)
Dept: CARDIOLOGY CLINIC | Age: 42
End: 2022-04-14

## 2022-04-25 ENCOUNTER — OFFICE VISIT (OUTPATIENT)
Dept: FAMILY MEDICINE CLINIC | Age: 42
End: 2022-04-25
Payer: COMMERCIAL

## 2022-04-25 VITALS
HEART RATE: 56 BPM | DIASTOLIC BLOOD PRESSURE: 70 MMHG | BODY MASS INDEX: 26.63 KG/M2 | SYSTOLIC BLOOD PRESSURE: 112 MMHG | HEIGHT: 64 IN | WEIGHT: 156 LBS | OXYGEN SATURATION: 98 %

## 2022-04-25 DIAGNOSIS — R10.11 RUQ ABDOMINAL PAIN: ICD-10-CM

## 2022-04-25 DIAGNOSIS — R07.9 CHEST PAIN, UNSPECIFIED TYPE: Primary | ICD-10-CM

## 2022-04-25 PROCEDURE — G8427 DOCREV CUR MEDS BY ELIG CLIN: HCPCS

## 2022-04-25 PROCEDURE — 1036F TOBACCO NON-USER: CPT

## 2022-04-25 PROCEDURE — 99213 OFFICE O/P EST LOW 20 MIN: CPT

## 2022-04-25 PROCEDURE — G8419 CALC BMI OUT NRM PARAM NOF/U: HCPCS

## 2022-04-25 PROCEDURE — 36415 COLL VENOUS BLD VENIPUNCTURE: CPT

## 2022-04-25 ASSESSMENT — ENCOUNTER SYMPTOMS
SHORTNESS OF BREATH: 0
ABDOMINAL DISTENTION: 0
COUGH: 0
CONSTIPATION: 0
CHEST TIGHTNESS: 0
BACK PAIN: 0
COLOR CHANGE: 0
EYE DISCHARGE: 0
ABDOMINAL PAIN: 0
EYE PAIN: 0
DIARRHEA: 0
SORE THROAT: 0

## 2022-04-25 NOTE — PROGRESS NOTES
[facility] Medicine  Establish care visit   2022    Wade Moznon (:  1980) is a 35 y.o. female, here to establish care. Chief Complaint   Patient presents with    Results     mri cardiac from [facility] /           ASSESSMENT/ PLAN  1. Chest pain, unspecified type  -Episodes of chest pain are still occurring. She had 1 episode after cardiac MRI on . Symptoms were still persistent with shortness of breath, nausea, sweating and left arm pain.  -We did consider possibly starting a beta-blocker. However, her heart rate is persistently below 60. We did discuss the possibility of maybe starting calcium channel blocker to help with vasospasms, 2.5 mg of amlodipine  -Could also consider low-dose long-acting nitroglycerin  -We will try Ranexa to begin with prior to seeing cardiology next month and assess for any improvement in symptoms    2. RUQ abdominal pain  -New onset right upper quadrant abdominal pain over the past week. She does have significant tenderness with palpation on exam  -Concerning for gallbladder disease  -We will do hepatic function panel and ultrasound of the right upper quadrant    - US GALLBLADDER RUQ; Future  - Hepatic Function Panel       Preventative Care:  Mammo UTD  PAP UTD      Return in about 8 weeks (around 2022), or if symptoms worsen or fail to improve, for Chest pain symptoms . HPI  Patient is here as an ED follow up. She recently was seen in the ED on 3/8 for chest pain. Was seen for same symptoms in 10/2021 as well. Patient has had 2 episodes of chest pain not a very similar nature. They both occurred while at rest.  In October she was awoken from her sleep with chest pressure and pain that started in the middle of the chest that went to left shoulder, left back and down the left arm. Chest pressure was described as if somebody had strapped a belt around her chest and was tightening it.   She also had numbness and tingling in the left hand. Other associated symptoms were palpitations, shortness of breath and diaphoresis. At that time her blood pressure was high in the 357Q and 191H systolically. She did proceed to go to the ED. Pain at the time was 7-8 out of 10.  Episode lasted about 60 minutes. She then had a recurrent episode that began on March 8 while she was at work sitting in a resting position eating lunch. Symptoms presented the exact same. Chest pain started in the middle of the chest went to the left shoulder, left back and down the left arm with numbness and tingling. She did feel diaphoretic as well as short of breath and did feel palpitations. She proceeded to go to the nurses office where she had a blood pressure checked and it was also in the 695E to 450E systolically. She has never had any issues with blood pressure. Typically her blood pressure runs in the low 868Z to 038L systolically. Today she does not report any symptoms of chest pain, pressure. Work-up in the ED was negative EKGs were unchanged. Troponins were also negative. D-dimer was mildly elevated. She did get a CTA of the chest and which was negative for PE. She does have a history of DVT about 17 years ago. She does take a baby aspirin. Interval Hx:   Patient did have cardiac MRI at [facility].  Apparently they did not find any mass in the chambers of the heart as well as the pulmonary vessels. No structural abnormalities. No valvular disease. 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 Pain was 4 out of 10 same exact symptoms noticed at times prior. Symptoms did last about an hour. She plans to follow-up with [facility] cardiology on 6/2. She did state that she would not mind seeing [facility] cardiology. The only reason that she did not follow-up with [facility] was more availability of the MRI to be done sooner.     She also reports new onset right upper quadrant pain has been present over the past week. She has had several episodes of right upper quadrant pain that does worsen with eating. They last about 30 minutes. Pain is 7 out of 10 when it is at its peak. She does still have her gallbladder. She thinks she may be having gallbladder problem. ROS  Review of Systems   Constitutional: Negative for chills, diaphoresis, fever and unexpected weight change. HENT: Negative for congestion, dental problem and sore throat. Eyes: Negative for pain and discharge. Respiratory: Negative for cough, chest tightness and shortness of breath. Cardiovascular: Negative for chest pain, palpitations and leg swelling. Near-syncope: light headed. Gastrointestinal: Negative for abdominal distention, abdominal pain, constipation and diarrhea. Endocrine: Negative for polydipsia, polyphagia and polyuria. Genitourinary: Negative for dysuria, flank pain, hematuria and urgency. Musculoskeletal: Negative for arthralgias, back pain, joint swelling and myalgias. Skin: Negative for color change and rash. Neurological: Negative for dizziness, light-headedness, numbness and headaches. Psychiatric/Behavioral: Negative for agitation and behavioral problems. The patient is not nervous/anxious. HISTORIES  Current Outpatient Medications on File Prior to Visit   Medication Sig Dispense Refill    loratadine (CLARITIN) 10 MG capsule Take 10 mg by mouth daily      aspirin 81 MG chewable tablet Take 81 mg by mouth daily. No current facility-administered medications on file prior to visit. No Known Allergies    Past Medical History:   Diagnosis Date    DVT (deep venous thrombosis) ([facility] [address] 2005    RLE (2 concurrent clots); post-partum; was on Lovenox for 3 mo.     History of blood clots        Patient Active Problem List   Diagnosis    Chest pain    History of DVT (deep vein thrombosis)       Past Surgical History:   Procedure Laterality Date    NASAL ENDOSCOPY  12/23/13    nasal endoscopy, removal of foreign body    TONSILLECTOMY      WRIST SURGERY  1995    plate put in       Social History     Socioeconomic History    Marital status:      Spouse name: Not on file    Number of children: Not on file    Years of education: Not on file    Highest education level: Not on file   Occupational History    Not on file   Tobacco Use    Smoking status: Never Smoker    Smokeless tobacco: Never Used   Substance and Sexual Activity    Alcohol use: No    Drug use: No    Sexual activity: Yes     Partners: Male   Other Topics Concern    Not on file   Social History Narrative    Not on file     Social Determinants of Health     Financial Resource Strain:     Difficulty of Paying Living Expenses: Not on file   Food Insecurity:     Worried About Running Out of Food in the Last Year: Not on file    Joe of Food in the Last Year: Not on file   Transportation Needs:     Lack of Transportation (Medical): Not on file    Lack of Transportation (Non-Medical):  Not on file   Physical Activity:     Days of Exercise per Week: Not on file    Minutes of Exercise per Session: Not on file   Stress:     Feeling of Stress : Not on file   Social Connections:     Frequency of Communication with Friends and Family: Not on file    Frequency of Social Gatherings with Friends and Family: Not on file    Attends [employer] Services: Not on file    Active Member of [address] [place] [address] or Organizations: Not on file    Attends Club or Organization Meetings: Not on file    Marital Status: Not on file   Intimate Partner Violence:     Fear of Current or Ex-Partner: Not on file    Emotionally Abused: Not on file    Physically Abused: Not on file    Sexually Abused: Not on file   Housing Stability:     Unable to Pay for Housing in the Last Year: Not on file    Number of [place] in the Last Year: Not on file    Unstable Housing in the Last Year: Not on file        Family History   Problem Relation Age of Onset    Cancer Maternal Grandmother [de-identified]        lung CA spread to breast    Heart Disease Maternal Grandfather 58        CHF    Cancer Paternal Grandfather         prostate    Alzheimer's Disease Paternal Grandfather     Mult Sclerosis Mother     Breast Cancer Mother 54    Heart Disease Paternal Grandmother        PE  Vitals:    04/25/22 1546   BP: 112/70   Site: Left Upper Arm   Position: Sitting   Cuff Size: Medium Adult   Pulse: 56   SpO2: 98%   Weight: 156 lb (70.8 kg)   Height: 5' 4\" (1.626 m)     Estimated body mass index is 26.78 kg/m² as calculated from the following:    Height as of this encounter: 5' 4\" (1.626 m). Weight as of this encounter: 156 lb (70.8 kg). Physical Exam  Constitutional:       General: She is not in acute distress. Appearance: Normal appearance. She is not ill-appearing. HENT:      Head: Normocephalic. Right Ear: Tympanic membrane and external ear normal.      Left Ear: Tympanic membrane and external ear normal.      Nose: No congestion or rhinorrhea. Mouth/Throat:      Mouth: Mucous membranes are moist.      Pharynx: Oropharynx is clear. No posterior oropharyngeal erythema. Eyes:      Extraocular Movements: Extraocular movements intact. Conjunctiva/sclera: Conjunctivae normal.      Pupils: Pupils are equal, round, and reactive to light. Cardiovascular:      Rate and Rhythm: Normal rate and regular rhythm. Pulses: Normal pulses. Heart sounds: Normal heart sounds. No murmur heard. Pulmonary:      Effort: Pulmonary effort is normal. No respiratory distress. Breath sounds: Normal breath sounds. No wheezing. Abdominal:      General: Abdomen is flat. Bowel sounds are normal.      Palpations: Abdomen is soft. Tenderness: There is abdominal tenderness in the right upper quadrant. Hernia: No hernia is present. Musculoskeletal:         General: No swelling.  Normal range of motion. Cervical back: Normal range of motion and neck supple. No tenderness. Right lower leg: No edema. Left lower leg: No edema. Lymphadenopathy:      Cervical: No cervical adenopathy. Skin:     General: Skin is warm and dry. Capillary Refill: Capillary refill takes less than 2 seconds. Neurological:      General: No focal deficit present. Mental Status: She is alert and oriented to person, place, and time. Mental status is at baseline. Cranial Nerves: No cranial nerve deficit. Psychiatric:         Mood and Affect: Mood normal.         Behavior: Behavior normal.         Judgment: Judgment normal.         Immunization History   Administered Date(s) Administered    COVID-19, Pfizer Purple top, DILUTE for use, 12+ yrs, 30mcg/0.3mL dose 09/18/2021, 10/09/2021       Health Maintenance   Topic Date Due    DTaP/Tdap/Td vaccine (1 - Tdap) Never done    Cervical cancer screen  Never done    COVID-19 Vaccine (3 - Booster for Pfizer series) 03/09/2022    Hepatitis C screen  03/29/2023 (Originally 8/12/1996)    HIV screen  03/29/2023 (Originally 8/12/1993)    Breast cancer screen  07/26/2022    Flu vaccine (Season Ended) 09/01/2022    Depression Screen  03/29/2023    Lipids  03/30/2027    Hepatitis A vaccine  Aged Out    Hepatitis B vaccine  Aged Out    Hib vaccine  Aged Out    Meningococcal (ACWY) vaccine  Aged Out    Pneumococcal 0-64 years Vaccine  Aged Out       PSH, PMH, SH and FH reviewed and noted. Recent and past labs, tests and consults also reviewed. Recent or new meds also reviewed. Eliza Whitten, MARIE - CNP    This dictation was generated by voice recognition computer software. Although all attempts are made to edit the dictation for accuracy, there may be errors in the transcription that are not intended.

## 2022-04-26 LAB
ALBUMIN SERPL-MCNC: 5.3 G/DL (ref 3.4–5)
ALP BLD-CCNC: 71 U/L (ref 40–129)
ALT SERPL-CCNC: 12 U/L (ref 10–40)
AST SERPL-CCNC: 17 U/L (ref 15–37)
BILIRUB SERPL-MCNC: 0.3 MG/DL (ref 0–1)
BILIRUBIN DIRECT: <0.2 MG/DL (ref 0–0.3)
BILIRUBIN, INDIRECT: ABNORMAL MG/DL (ref 0–1)
TOTAL PROTEIN: 7.4 G/DL (ref 6.4–8.2)

## 2022-04-26 RX ORDER — RANOLAZINE 500 MG/1
500 TABLET, EXTENDED RELEASE ORAL 2 TIMES DAILY
Qty: 60 TABLET | Refills: 0 | Status: SHIPPED | OUTPATIENT
Start: 2022-04-26 | End: 2022-05-03

## 2022-04-26 NOTE — RESULT ENCOUNTER NOTE
Hepatic function panel did not show any elevation of liver enzymes at this time.   Considering that she still has a right upper quadrant pain I will still pursue the gallbladder ultrasound

## 2022-05-03 ENCOUNTER — HOSPITAL ENCOUNTER (OUTPATIENT)
Dept: ULTRASOUND IMAGING | Age: 42
Discharge: HOME | End: 2022-05-03
Payer: COMMERCIAL

## 2022-05-03 DIAGNOSIS — R10.11 RUQ ABDOMINAL PAIN: ICD-10-CM

## 2022-05-03 DIAGNOSIS — R07.9 CHEST PAIN, UNSPECIFIED TYPE: Primary | ICD-10-CM

## 2022-05-03 PROCEDURE — 76705 ECHO EXAM OF ABDOMEN: CPT

## 2022-05-03 RX ORDER — RANOLAZINE 1000 MG/1
1000 TABLET, EXTENDED RELEASE ORAL 2 TIMES DAILY
Qty: 60 TABLET | Refills: 0 | Status: SHIPPED | OUTPATIENT
Start: 2022-05-03 | End: 2022-05-23

## 2022-05-03 NOTE — PROGRESS NOTES
Discussed ultrasound results with patient over the phone today. She did report meanwhile that she did have another severe episode of chest pain while she was at work. Her blood pressure did increase at that time. She was started on Ranexa 500 mg twice daily prior to this episode.   Considering that she is still symptomatic and having episodes of chest pain, high blood pressures and shortness of breath we will increase Ranexa to 1000 mg twice daily and try to facilitate cardiology appointment at a sooner date

## 2022-05-21 DIAGNOSIS — R07.9 CHEST PAIN, UNSPECIFIED TYPE: ICD-10-CM

## 2022-05-23 DIAGNOSIS — R07.9 CHEST PAIN, UNSPECIFIED TYPE: ICD-10-CM

## 2022-05-23 RX ORDER — RANOLAZINE 500 MG/1
TABLET, EXTENDED RELEASE ORAL
Qty: 60 TABLET | Refills: 0 | Status: SHIPPED | OUTPATIENT
Start: 2022-05-23 | End: 2022-06-15

## 2022-05-23 RX ORDER — RANOLAZINE 1000 MG/1
1000 TABLET, EXTENDED RELEASE ORAL 2 TIMES DAILY
Qty: 60 TABLET | Refills: 0 | OUTPATIENT
Start: 2022-05-23

## 2022-06-15 DIAGNOSIS — R07.9 CHEST PAIN, UNSPECIFIED TYPE: ICD-10-CM

## 2022-06-15 RX ORDER — RANOLAZINE 500 MG/1
TABLET, EXTENDED RELEASE ORAL
Qty: 60 TABLET | Refills: 3 | Status: SHIPPED | OUTPATIENT
Start: 2022-06-15 | End: 2022-06-20

## 2022-06-20 ENCOUNTER — OFFICE VISIT (OUTPATIENT)
Dept: FAMILY MEDICINE CLINIC | Age: 42
End: 2022-06-20
Payer: COMMERCIAL

## 2022-06-20 VITALS
DIASTOLIC BLOOD PRESSURE: 60 MMHG | WEIGHT: 154 LBS | HEART RATE: 67 BPM | BODY MASS INDEX: 26.43 KG/M2 | OXYGEN SATURATION: 99 % | SYSTOLIC BLOOD PRESSURE: 110 MMHG

## 2022-06-20 DIAGNOSIS — Z00.00 ENCOUNTER FOR WELL ADULT EXAM WITHOUT ABNORMAL FINDINGS: ICD-10-CM

## 2022-06-20 DIAGNOSIS — I20.1 VASOSPASTIC ANGINA (HCC): Primary | ICD-10-CM

## 2022-06-20 PROCEDURE — 99213 OFFICE O/P EST LOW 20 MIN: CPT

## 2022-06-20 PROCEDURE — G8427 DOCREV CUR MEDS BY ELIG CLIN: HCPCS

## 2022-06-20 PROCEDURE — G8419 CALC BMI OUT NRM PARAM NOF/U: HCPCS

## 2022-06-20 PROCEDURE — 1036F TOBACCO NON-USER: CPT

## 2022-06-20 RX ORDER — AMLODIPINE BESYLATE 2.5 MG/1
TABLET ORAL
COMMUNITY
Start: 2022-06-03

## 2022-06-20 RX ORDER — ROSUVASTATIN CALCIUM 10 MG/1
TABLET, COATED ORAL
COMMUNITY
Start: 2022-06-03

## 2022-06-20 ASSESSMENT — ENCOUNTER SYMPTOMS
EYE DISCHARGE: 0
CONSTIPATION: 0
DIARRHEA: 0
CHEST TIGHTNESS: 0
ABDOMINAL DISTENTION: 0
SORE THROAT: 0
COUGH: 0
COLOR CHANGE: 0
EYE PAIN: 0
SHORTNESS OF BREATH: 0
ABDOMINAL PAIN: 0

## 2022-06-20 NOTE — PROGRESS NOTES
[facility]  2022    Cr Wood (:  1980) is a 35 y.o. female, here for evaluation of the following medical concerns:    Chief Complaint   Patient presents with    Annual Exam        ASSESSMENT/ PLAN  1. Vasospastic angina (HCC)  -Continue amlodipine 2.5 mg  -According to cardiology's note it is unclear whether she is supposed to be on Ranexa twice daily with amlodipine or to discontinue Ranexa  -Plans to have CCTA in July to rule out obstructive coronary artery disease  -We will call cardiologist office for clarification    -Clarification was verified by patient. She did talk to her cardiologist and she is supposed to be taking Ranexa twice daily as well as Norvasc 2.5 mg daily    2. Encounter for well adult exam without abnormal findings  -No abnormal findings found on exam       Return in about 6 months (around 2022), or if symptoms worsen or fail to improve, for Regular follow up. HPI  Since last time patient has had about 1 episode per week of angina. She did follow-up with cardiology at [facility] on . She was diagnosed with Prinzmetal's and suspicion of ischemia with no obstructive coronary artery disease. Cardiology plans to do a CT calcium scoring on July 15 to rule out any coronary artery disease with obstruction. She was taken off Ranexa and started on amlodipine 2.5 daily. She is also on Lipitor 10 mg daily. She was given sublingual nitroglycerin tablets that she has not had to use at this time yet. Plans to participate in the warrior study    ROS  Review of Systems   Constitutional: Negative for chills, fever and unexpected weight change. HENT: Negative for congestion, dental problem and sore throat. Eyes: Negative for pain and discharge. Respiratory: Negative for cough, chest tightness and shortness of breath. Cardiovascular: Negative for chest pain, palpitations and leg swelling.    Gastrointestinal: Negative for abdominal distention, abdominal pain, constipation and diarrhea. Endocrine: Negative for polydipsia, polyphagia and polyuria. Genitourinary: Negative for dysuria, flank pain, hematuria and urgency. Musculoskeletal: Negative for arthralgias, joint swelling and myalgias. Skin: Negative for color change and rash. Neurological: Negative for dizziness, light-headedness, numbness and headaches. Psychiatric/Behavioral: Negative for agitation and behavioral problems. The patient is not nervous/anxious. HISTORIES  Current Outpatient Medications on File Prior to Visit   Medication Sig Dispense Refill    rosuvastatin (CRESTOR) 10 MG tablet       amLODIPine (NORVASC) 2.5 MG tablet       aspirin 81 MG chewable tablet Take 81 mg by mouth daily. No current facility-administered medications on file prior to visit. No Known Allergies  Past Medical History:   Diagnosis Date    DVT (deep venous thrombosis) ([facility] [address] 2005    RLE (2 concurrent clots); post-partum; was on Lovenox for 3 mo.     History of blood clots     Vasospastic angina ([facility] [address] 6/20/2022     Patient Active Problem List   Diagnosis    History of DVT (deep vein thrombosis)    Vasospastic angina [facility])     Past Surgical History:   Procedure Laterality Date    NASAL ENDOSCOPY  12/23/13    nasal endoscopy, removal of foreign body    TONSILLECTOMY      WRIST SURGERY  1995    plate put in     Social History     Socioeconomic History    Marital status:      Spouse name: Not on file    Number of children: Not on file    Years of education: Not on file    Highest education level: Not on file   Occupational History    Not on file   Tobacco Use    Smoking status: Never Smoker    Smokeless tobacco: Never Used   Substance and Sexual Activity    Alcohol use: No    Drug use: No    Sexual activity: Yes     Partners: Male   Other Topics Concern    Not on file   Social History Narrative    Not on file     Social Determinants of Health Financial Resource Strain:     Difficulty of Paying Living Expenses: Not on file   Food Insecurity:     Worried About Running Out of Food in the Last Year: Not on file    Joe of Food in the Last Year: Not on file   Transportation Needs:     Lack of Transportation (Medical): Not on file    Lack of Transportation (Non-Medical): Not on file   Physical Activity:     Days of Exercise per Week: Not on file    Minutes of Exercise per Session: Not on file   Stress:     Feeling of Stress : Not on file   Social Connections:     Frequency of Communication with Friends and Family: Not on file    Frequency of Social Gatherings with Friends and Family: Not on file    Attends Taoist Services: Not on file    Active Member of [address] [place] [address] [employer] or Organizations: Not on file    Attends Club or Organization Meetings: Not on file    Marital Status: Not on file   Intimate Partner Violence:     Fear of Current or Ex-Partner: Not on file    Emotionally Abused: Not on file    Physically Abused: Not on file    Sexually Abused: Not on file   Housing Stability:     Unable to Pay for Housing in the Last Year: Not on file    Number of [place] in the Last Year: Not on file    Unstable Housing in the Last Year: Not on file      Family History   Problem Relation Age of Onset    Cancer Maternal Grandmother [de-identified]        lung CA spread to breast    Heart Disease Maternal Grandfather 58        CHF    Cancer Paternal Grandfather         prostate    Alzheimer's Disease Paternal Grandfather     Mult Sclerosis Mother     Breast Cancer Mother 54    Heart Disease Paternal Grandmother        PE  Vitals:    06/20/22 0940   BP: 110/60   Site: Left Upper Arm   Position: Sitting   Cuff Size: Medium Adult   Pulse: 67   SpO2: 99%   Weight: 154 lb (69.9 kg)     Estimated body mass index is 26.43 kg/m² as calculated from the following:    Height as of 4/25/22: 5' 4\" (1.626 m). Weight as of this encounter: 154 lb (69.9 kg).     Physical Exam  Constitutional:       General: She is not in acute distress. Appearance: Normal appearance. She is not ill-appearing. HENT:      Head: Normocephalic. Right Ear: Tympanic membrane and external ear normal.      Left Ear: Tympanic membrane and external ear normal.      Nose: No congestion or rhinorrhea. Mouth/Throat:      Mouth: Mucous membranes are moist.      Pharynx: Oropharynx is clear. No posterior oropharyngeal erythema. Eyes:      Extraocular Movements: Extraocular movements intact. Conjunctiva/sclera: Conjunctivae normal.      Pupils: Pupils are equal, round, and reactive to light. Cardiovascular:      Rate and Rhythm: Normal rate and regular rhythm. Pulses: Normal pulses. Heart sounds: Normal heart sounds. No murmur heard. Pulmonary:      Effort: Pulmonary effort is normal. No respiratory distress. Breath sounds: Normal breath sounds. No wheezing. Abdominal:      General: Abdomen is flat. Bowel sounds are normal.      Palpations: Abdomen is soft. Tenderness: There is no abdominal tenderness. Hernia: No hernia is present. Musculoskeletal:         General: No swelling. Normal range of motion. Cervical back: Normal range of motion and neck supple. No tenderness. Right lower leg: No edema. Left lower leg: No edema. Lymphadenopathy:      Cervical: No cervical adenopathy. Skin:     General: Skin is warm and dry. Capillary Refill: Capillary refill takes less than 2 seconds. Neurological:      General: No focal deficit present. Mental Status: She is alert and oriented to person, place, and time. Mental status is at baseline. Cranial Nerves: No cranial nerve deficit. Psychiatric:         Mood and Affect: Mood normal.         Behavior: Behavior normal.         Judgment: Judgment normal.         MARIE Marshall - CNP    This dictation was generated by voice recognition computer software.   Although all attempts are made to edit the dictation for accuracy, there may be errors in the transcription that are not intended.

## 2022-07-27 ENCOUNTER — HOSPITAL ENCOUNTER (OUTPATIENT)
Dept: MAMMOGRAPHY | Age: 42
Discharge: HOME | End: 2022-07-27
Payer: COMMERCIAL

## 2022-07-27 ENCOUNTER — TELEPHONE (OUTPATIENT)
Dept: MAMMOGRAPHY | Age: 42
End: 2022-07-27

## 2022-07-27 DIAGNOSIS — Z12.31 SCREENING MAMMOGRAM, ENCOUNTER FOR: ICD-10-CM

## 2022-07-27 PROCEDURE — 77063 BREAST TOMOSYNTHESIS BI: CPT

## 2022-12-19 ENCOUNTER — OFFICE VISIT (OUTPATIENT)
Dept: FAMILY MEDICINE CLINIC | Age: 42
End: 2022-12-19
Payer: COMMERCIAL

## 2022-12-19 VITALS — DIASTOLIC BLOOD PRESSURE: 68 MMHG | BODY MASS INDEX: 27.64 KG/M2 | SYSTOLIC BLOOD PRESSURE: 110 MMHG | WEIGHT: 161 LBS

## 2022-12-19 DIAGNOSIS — Z28.21 REFUSED INFLUENZA VACCINE: ICD-10-CM

## 2022-12-19 DIAGNOSIS — I20.1 VASOSPASTIC ANGINA (HCC): Primary | ICD-10-CM

## 2022-12-19 PROCEDURE — 99213 OFFICE O/P EST LOW 20 MIN: CPT

## 2022-12-19 PROCEDURE — 1036F TOBACCO NON-USER: CPT

## 2022-12-19 PROCEDURE — G8419 CALC BMI OUT NRM PARAM NOF/U: HCPCS

## 2022-12-19 PROCEDURE — G8427 DOCREV CUR MEDS BY ELIG CLIN: HCPCS

## 2022-12-19 PROCEDURE — G8484 FLU IMMUNIZE NO ADMIN: HCPCS

## 2022-12-19 RX ORDER — LISINOPRIL 2.5 MG/1
2.5 TABLET ORAL DAILY
COMMUNITY
Start: 2022-12-05 | End: 2023-06-03

## 2022-12-19 ASSESSMENT — ENCOUNTER SYMPTOMS
COUGH: 0
DIARRHEA: 0
SHORTNESS OF BREATH: 0
EYE PAIN: 0
CHEST TIGHTNESS: 0
CONSTIPATION: 0
EYE DISCHARGE: 0
ABDOMINAL PAIN: 0
SORE THROAT: 0
ABDOMINAL DISTENTION: 0
COLOR CHANGE: 0

## 2022-12-19 NOTE — PROGRESS NOTES
[facility]  2022    Angelika Cintron (:  1980) is a 38 y.o. female, here for evaluation of the following medical concerns:    Chief Complaint   Patient presents with    Chest Pain     Much better still seeing the cardiologist         ASSESSMENT/ PLAN  1. Vasospastic angina (HCC)  -Stable on amlodipine 2.5 mg and lisinopril 2.5 mg  -Continue baby aspirin  -Doing well off Ranexa  -No current symptoms of shortness of breath or chest pain  - CBC with Auto Differential; Future  - Comprehensive Metabolic Panel; Future  - Lipid, Fasting; Future    2. Refused influenza vaccine  -Refused flu vaccine      Imaging:   Calcium score 22    Impression    IMPRESSION:     Calcium score: 0     Coronary artery findings: normal coronary anatomy         CAD-RADS Score 0   See table below for interpretation. See separate radiology report for noncardiac ancillary findings. CT CARDIAC ANGIOGRAPHY  22    Impression    IMPRESSION:   1. Coronary artery and cardiac findings will be dictated separately by cardiology. 2. No significant ancillary findings identified. Return in about 1 year (around 2023) for regular follow up. Melissa HARDING  Since last time patient has had about 1 episode per week of angina. She did follow-up with cardiology at [facility] on . She was diagnosed with Prinzmetal's and suspicion of ischemia with no obstructive coronary artery disease. Cardiology plans to do a CT calcium scoring on July 15 to rule out any coronary artery disease with obstruction. She was taken off Ranexa and started on amlodipine 2.5 daily. She is also on Lipitor 10 mg daily. She was given sublingual nitroglycerin tablets that she has not had to use at this time yet. Plans to participate in the warrior study    Interval Hx 22:  Patient is here to follow-up on vasospastic angina. She has had 1 episode of chest pain 6 weeks. Doing well on amlodipine and lisinopril. Has stopped Ranexa as directed by cardiology. Follow-up with cardiology as directed. CT calcium score was 0.    ROS  Review of Systems   Constitutional:  Negative for chills, fever and unexpected weight change. HENT:  Negative for congestion, dental problem and sore throat. Eyes:  Negative for pain and discharge. Respiratory:  Negative for cough, chest tightness and shortness of breath. Cardiovascular:  Negative for chest pain, palpitations and leg swelling. Gastrointestinal:  Negative for abdominal distention, abdominal pain, constipation and diarrhea. Endocrine: Negative for polydipsia, polyphagia and polyuria. Genitourinary:  Negative for dysuria, flank pain, hematuria and urgency. Musculoskeletal:  Negative for arthralgias, joint swelling and myalgias. Skin:  Negative for color change and rash. Neurological:  Negative for dizziness, light-headedness, numbness and headaches. Psychiatric/Behavioral:  Negative for agitation and behavioral problems. The patient is not nervous/anxious. HISTORIES  Current Outpatient Medications on File Prior to Visit   Medication Sig Dispense Refill    lisinopril (PRINIVIL;ZESTRIL) 2.5 MG tablet Take 2.5 mg by mouth daily      amLODIPine (NORVASC) 2.5 MG tablet       aspirin 81 MG chewable tablet Take 81 mg by mouth daily. No current facility-administered medications on file prior to visit. No Known Allergies  Past Medical History:   Diagnosis Date    DVT (deep venous thrombosis) ([facility] [address] 2005    RLE (2 concurrent clots); post-partum; was on Lovenox for 3 mo.     History of blood clots     Vasospastic angina ([facility] [address] 6/20/2022     Patient Active Problem List   Diagnosis    History of DVT (deep vein thrombosis)    Vasospastic angina [facility])     Past Surgical History:   Procedure Laterality Date    NASAL ENDOSCOPY  12/23/13    nasal endoscopy, removal of foreign body    TONSILLECTOMY      WRIST SURGERY  1995    plate put in     Social History Socioeconomic History    Marital status:      Spouse name: Not on file    Number of children: Not on file    Years of education: Not on file    Highest education level: Not on file   Occupational History    Not on file   Tobacco Use    Smoking status: Never    Smokeless tobacco: Never   Substance and Sexual Activity    Alcohol use: No    Drug use: No    Sexual activity: Yes     Partners: Male   Other Topics Concern    Not on file   Social History Narrative    Not on file     Social Determinants of Health     Financial Resource Strain: Not on file   Food Insecurity: Not on file   Transportation Needs: Not on file   Physical Activity: Not on file   Stress: Not on file   Social Connections: Not on file   Intimate Partner Violence: Not on file   Housing Stability: Not on file      Family History   Problem Relation Age of Onset    Cancer Maternal Grandmother [de-identified]        lung CA spread to breast    Heart Disease Maternal Grandfather 58        CHF    Cancer Paternal Grandfather         prostate    Alzheimer's Disease Paternal Grandfather     Mult Sclerosis Mother     Breast Cancer Mother 54    Heart Disease Paternal Grandmother        PE  Vitals:    12/19/22 1622   BP: 110/68   Site: Right Upper Arm   Position: Sitting   Cuff Size: Medium Adult   Weight: 161 lb (73 kg)     Estimated body mass index is 27.64 kg/m² as calculated from the following:    Height as of 4/25/22: 5' 4\" (1.626 m). Weight as of this encounter: 161 lb (73 kg). Physical Exam  Constitutional:       General: She is not in acute distress. Appearance: Normal appearance. She is not ill-appearing. HENT:      Head: Normocephalic. Right Ear: Tympanic membrane and external ear normal.      Left Ear: Tympanic membrane and external ear normal.      Nose: No congestion or rhinorrhea. Mouth/Throat:      Mouth: Mucous membranes are moist.      Pharynx: Oropharynx is clear. No posterior oropharyngeal erythema.    Eyes:      Extraocular Movements: Extraocular movements intact. Conjunctiva/sclera: Conjunctivae normal.      Pupils: Pupils are equal, round, and reactive to light. Cardiovascular:      Rate and Rhythm: Normal rate and regular rhythm. Pulses: Normal pulses. Heart sounds: Normal heart sounds. No murmur heard. Pulmonary:      Effort: Pulmonary effort is normal. No respiratory distress. Breath sounds: Normal breath sounds. No wheezing. Abdominal:      General: Abdomen is flat. Bowel sounds are normal.      Palpations: Abdomen is soft. Tenderness: There is no abdominal tenderness. Hernia: No hernia is present. Musculoskeletal:         General: No swelling. Normal range of motion. Cervical back: Normal range of motion and neck supple. No tenderness. Right lower leg: No edema. Left lower leg: No edema. Lymphadenopathy:      Cervical: No cervical adenopathy. Skin:     General: Skin is warm and dry. Capillary Refill: Capillary refill takes less than 2 seconds. Neurological:      General: No focal deficit present. Mental Status: She is alert and oriented to person, place, and time. Mental status is at baseline. Cranial Nerves: No cranial nerve deficit. Psychiatric:         Mood and Affect: Mood normal.         Behavior: Behavior normal.         Judgment: Judgment normal.       Creed Hamman, APRN - CNP    This dictation was generated by voice recognition computer software. Although all attempts are made to edit the dictation for accuracy, there may be errors in the transcription that are not intended.

## 2023-03-21 ENCOUNTER — APPOINTMENT (OUTPATIENT)
Dept: CT IMAGING | Age: 43
End: 2023-03-21
Payer: COMMERCIAL

## 2023-03-21 ENCOUNTER — HOSPITAL ENCOUNTER (EMERGENCY)
Age: 43
Discharge: HOME | End: 2023-03-21
Payer: COMMERCIAL

## 2023-03-21 VITALS
WEIGHT: 158 LBS | HEIGHT: 64 IN | OXYGEN SATURATION: 98 % | RESPIRATION RATE: 18 BRPM | DIASTOLIC BLOOD PRESSURE: 65 MMHG | HEART RATE: 61 BPM | BODY MASS INDEX: 26.98 KG/M2 | SYSTOLIC BLOOD PRESSURE: 144 MMHG | TEMPERATURE: 97 F

## 2023-03-21 DIAGNOSIS — S30.1XXA CONTUSION OF ABDOMINAL WALL, INITIAL ENCOUNTER: ICD-10-CM

## 2023-03-21 DIAGNOSIS — R10.11 ABDOMINAL PAIN, RIGHT UPPER QUADRANT: Primary | ICD-10-CM

## 2023-03-21 LAB
ALBUMIN SERPL-MCNC: 5.1 G/DL (ref 3.4–5)
ALBUMIN/GLOB SERPL: 2.4 {RATIO} (ref 1.1–2.2)
ALP SERPL-CCNC: 66 U/L (ref 40–129)
ALT SERPL-CCNC: 13 U/L (ref 10–40)
ANION GAP SERPL CALCULATED.3IONS-SCNC: 11 MMOL/L (ref 3–16)
AST SERPL-CCNC: 17 U/L (ref 15–37)
BASOPHILS # BLD: 0 K/UL (ref 0–0.2)
BASOPHILS NFR BLD: 0.5 %
BILIRUB SERPL-MCNC: 0.4 MG/DL (ref 0–1)
BILIRUB UR QL STRIP.AUTO: NEGATIVE
BUN SERPL-MCNC: 9 MG/DL (ref 7–20)
CALCIUM SERPL-MCNC: 9.9 MG/DL (ref 8.3–10.6)
CHLORIDE SERPL-SCNC: 101 MMOL/L (ref 99–110)
CLARITY UR: CLEAR
CO2 SERPL-SCNC: 28 MMOL/L (ref 21–32)
COLOR UR: YELLOW
CREAT SERPL-MCNC: <0.5 MG/DL (ref 0.6–1.1)
D DIMER: 0.44 UG/ML FEU (ref 0–0.6)
DEPRECATED RDW RBC AUTO: 14 % (ref 12.4–15.4)
EOSINOPHIL # BLD: 0.1 K/UL (ref 0–0.6)
EOSINOPHIL NFR BLD: 1 %
GFR SERPLBLD CREATININE-BSD FMLA CKD-EPI: >60 ML/MIN/{1.73_M2}
GLUCOSE SERPL-MCNC: 91 MG/DL (ref 70–99)
GLUCOSE UR STRIP.AUTO-MCNC: NEGATIVE MG/DL
HCG UR QL: NEGATIVE
HCT VFR BLD AUTO: 40.3 % (ref 36–48)
HGB BLD-MCNC: 13.5 G/DL (ref 12–16)
HGB UR QL STRIP.AUTO: NEGATIVE
KETONES UR STRIP.AUTO-MCNC: NEGATIVE MG/DL
LEUKOCYTE ESTERASE UR QL STRIP.AUTO: NEGATIVE
LIPASE SERPL-CCNC: 25 U/L (ref 13–60)
LYMPHOCYTES # BLD: 1.4 K/UL (ref 1–5.1)
LYMPHOCYTES NFR BLD: 21.5 %
MCH RBC QN AUTO: 28.5 PG (ref 26–34)
MCHC RBC AUTO-ENTMCNC: 33.5 G/DL (ref 31–36)
MCV RBC AUTO: 85 FL (ref 80–100)
MONOCYTES # BLD: 0.5 K/UL (ref 0–1.3)
MONOCYTES NFR BLD: 7.3 %
NEUTROPHILS # BLD: 4.5 K/UL (ref 1.7–7.7)
NEUTROPHILS NFR BLD: 69.7 %
NITRITE UR QL STRIP.AUTO: NEGATIVE
PH UR STRIP.AUTO: 7 [PH] (ref 5–8)
PLATELET # BLD AUTO: 197 K/UL (ref 135–450)
PMV BLD AUTO: 9.2 FL (ref 5–10.5)
POTASSIUM SERPL-SCNC: 4 MMOL/L (ref 3.5–5.1)
PROT SERPL-MCNC: 7.2 G/DL (ref 6.4–8.2)
PROT UR STRIP.AUTO-MCNC: NEGATIVE MG/DL
RBC # BLD AUTO: 4.74 M/UL (ref 4–5.2)
SODIUM SERPL-SCNC: 140 MMOL/L (ref 136–145)
SP GR UR STRIP.AUTO: <=1.005 (ref 1–1.03)
TROPONIN T SERPL-MCNC: <0.01 NG/ML
UA COMPLETE W REFLEX CULTURE PNL UR: NORMAL
UA DIPSTICK W REFLEX MICRO PNL UR: NORMAL
URN SPEC COLLECT METH UR: NORMAL
UROBILINOGEN UR STRIP-ACNC: 0.2 E.U./DL
WBC # BLD AUTO: 6.4 K/UL (ref 4–11)

## 2023-03-21 PROCEDURE — 84703 CHORIONIC GONADOTROPIN ASSAY: CPT

## 2023-03-21 PROCEDURE — 93005 ELECTROCARDIOGRAM TRACING: CPT | Performed by: PHYSICIAN ASSISTANT

## 2023-03-21 PROCEDURE — 83690 ASSAY OF LIPASE: CPT

## 2023-03-21 PROCEDURE — 96374 THER/PROPH/DIAG INJ IV PUSH: CPT

## 2023-03-21 PROCEDURE — 81003 URINALYSIS AUTO W/O SCOPE: CPT

## 2023-03-21 PROCEDURE — 99285 EMERGENCY DEPT VISIT HI MDM: CPT

## 2023-03-21 PROCEDURE — 74177 CT ABD & PELVIS W/CONTRAST: CPT

## 2023-03-21 PROCEDURE — 6370000000 HC RX 637 (ALT 250 FOR IP): Performed by: PHYSICIAN ASSISTANT

## 2023-03-21 PROCEDURE — 36415 COLL VENOUS BLD VENIPUNCTURE: CPT

## 2023-03-21 PROCEDURE — 6360000002 HC RX W HCPCS: Performed by: PHYSICIAN ASSISTANT

## 2023-03-21 PROCEDURE — 80053 COMPREHEN METABOLIC PANEL: CPT

## 2023-03-21 PROCEDURE — 85025 COMPLETE CBC W/AUTO DIFF WBC: CPT

## 2023-03-21 PROCEDURE — 85379 FIBRIN DEGRADATION QUANT: CPT

## 2023-03-21 PROCEDURE — 84484 ASSAY OF TROPONIN QUANT: CPT

## 2023-03-21 PROCEDURE — 6360000004 HC RX CONTRAST MEDICATION: Performed by: PHYSICIAN ASSISTANT

## 2023-03-21 RX ORDER — LIDOCAINE 4 G/G
1 PATCH TOPICAL ONCE
Status: DISCONTINUED | OUTPATIENT
Start: 2023-03-21 | End: 2023-03-22 | Stop reason: HOSPADM

## 2023-03-21 RX ORDER — LIDOCAINE 50 MG/G
1 PATCH TOPICAL DAILY
Qty: 10 PATCH | Refills: 0 | Status: SHIPPED | OUTPATIENT
Start: 2023-03-21 | End: 2023-03-31

## 2023-03-21 RX ORDER — ONDANSETRON 4 MG/1
4 TABLET, ORALLY DISINTEGRATING ORAL ONCE
Status: COMPLETED | OUTPATIENT
Start: 2023-03-21 | End: 2023-03-21

## 2023-03-21 RX ORDER — KETOROLAC TROMETHAMINE 30 MG/ML
15 INJECTION, SOLUTION INTRAMUSCULAR; INTRAVENOUS ONCE
Status: COMPLETED | OUTPATIENT
Start: 2023-03-21 | End: 2023-03-21

## 2023-03-21 RX ADMIN — KETOROLAC TROMETHAMINE 15 MG: 30 INJECTION, SOLUTION INTRAMUSCULAR at 19:31

## 2023-03-21 RX ADMIN — ONDANSETRON 4 MG: 4 TABLET, ORALLY DISINTEGRATING ORAL at 19:32

## 2023-03-21 RX ADMIN — IOPAMIDOL 75 ML: 755 INJECTION, SOLUTION INTRAVENOUS at 20:41

## 2023-03-21 ASSESSMENT — PAIN DESCRIPTION - PAIN TYPE: TYPE: ACUTE PAIN

## 2023-03-21 ASSESSMENT — PAIN DESCRIPTION - LOCATION: LOCATION: ABDOMEN

## 2023-03-21 ASSESSMENT — PAIN DESCRIPTION - DESCRIPTORS: DESCRIPTORS: SHARP

## 2023-03-21 ASSESSMENT — PAIN DESCRIPTION - FREQUENCY: FREQUENCY: CONTINUOUS

## 2023-03-21 ASSESSMENT — PAIN SCALES - GENERAL: PAINLEVEL_OUTOF10: 8

## 2023-03-21 ASSESSMENT — PAIN DESCRIPTION - ORIENTATION: ORIENTATION: RIGHT;UPPER

## 2023-03-21 ASSESSMENT — PAIN - FUNCTIONAL ASSESSMENT: PAIN_FUNCTIONAL_ASSESSMENT: 0-10

## 2023-03-21 NOTE — ED PROVIDER NOTES
intact. ENT: Mucous membranes are moist.   NECK: Supple. HEART: RRR. No murmurs. Radial pulses 2+ symmetric, PT pulses 2+, symmetric   LUNGS: Respirations unlabored. CTAB. Good air exchange. Speaking comfortably in full sentences. ABDOMEN: Soft. Pain to palpation in the RUQ with palpation and rebound tenderness. Abdomen is non-distended. No evidence of masses. No organomegaly. No guarding. No CVA tenderness. EXTREMITIES: No peripheral edema. Moves all extremities equally without assistance or difficulty. All extremities neurovascularly intact. SKIN: Warm and dry. No acute rashes. Skin is intact. NEUROLOGICAL: Alert and oriented x 4. GCS 15. CN grossly intact. No gross facial drooping. Power intact to UE and LE, sensation intact x 4. No tremors or ataxia. PSYCHIATRIC: Normal mood and affect. DIAGNOSTIC RESULTS   LABS:    Labs Reviewed   COMPREHENSIVE METABOLIC PANEL W/ REFLEX TO MG FOR LOW K - Abnormal; Notable for the following components:       Result Value    Creatinine <0.5 (*)     Albumin 5.1 (*)     Albumin/Globulin Ratio 2.4 (*)     All other components within normal limits   CBC WITH AUTO DIFFERENTIAL   LIPASE   URINALYSIS WITH REFLEX TO CULTURE   PREGNANCY, URINE   TROPONIN   D-DIMER, QUANTITATIVE       When ordered only abnormal lab results are displayed. All other labs were within normal range or not returned as of this dictation. EKG: When ordered, EKG's are interpreted by the Emergency Department Physician in the absence of a cardiologist.  Please see their note for interpretation of EKG. RADIOLOGY:   Non-plain film images such as CT, Ultrasound and MRI are read by the radiologist. Plain radiographic images are visualized and preliminarily interpreted by the ED Provider with the below findings:        Interpretation per the Radiologist below, if available at the time of this note:    CT ABDOMEN PELVIS W IV CONTRAST Additional Contrast? None   Final Result   1. Normal appendix. forgot to tell me that she did strain her chest wall and abdomen from a recent fall and she believes that her symptoms could be secondary to that. We see no sign of trauma on physical exam or on her work-up today therefore at this time I believe patient is reasonable candidate for discharge home with Lidoderm patch and supportive care instructions and strict ER return precautions. Disposition Considerations (include 1 Tests not done, Shared Decision Making, Pt Expectation of Test or Tx.): Pt is in agreement with the current plan and all questions were addressed. Appropriate for outpatient management        I am the Primary Clinician of Record. FINAL IMPRESSION      1. Abdominal pain, right upper quadrant    2.  Contusion of abdominal wall, initial encounter          DISPOSITION/PLAN     DISPOSITION Decision To Discharge 03/21/2023 09:52:01 PM      PATIENT REFERRED TO:  Marilyn Márquez [id number]  [phone]    Schedule an appointment as soon as possible for a visit       [facility]  [address] [facility] [place] [place] [address]  Go to   If symptoms worsen    DISCHARGE MEDICATIONS:  Discharge Medication List as of 3/21/2023 10:01 PM        START taking these medications    Details   lidocaine (LIDODERM) 5 % Place 1 patch onto the skin daily for 10 days 12 hours on, 12 hours off., Disp-10 patch, R-0Normal             DISCONTINUED MEDICATIONS:  Discharge Medication List as of 3/21/2023 10:01 PM                 (Please note that portions of this note were completed with a voice recognition program.  Efforts were made to edit the dictations but occasionally words are mis-transcribed.)    Ирина Milton (electronically signed)            Ирина Milton  03/23/23 [id number]

## 2023-03-22 LAB
EKG ATRIAL RATE: 53 BPM
EKG DIAGNOSIS: NORMAL
EKG P AXIS: 33 DEGREES
EKG P-R INTERVAL: 140 MS
EKG Q-T INTERVAL: 434 MS
EKG QRS DURATION: 76 MS
EKG QTC CALCULATION (BAZETT): 407 MS
EKG R AXIS: 39 DEGREES
EKG T AXIS: 32 DEGREES
EKG VENTRICULAR RATE: 53 BPM

## 2023-03-22 PROCEDURE — 93010 ELECTROCARDIOGRAM REPORT: CPT | Performed by: INTERNAL MEDICINE

## 2023-03-31 ENCOUNTER — NURSE ONLY (OUTPATIENT)
Dept: FAMILY MEDICINE CLINIC | Age: 43
End: 2023-03-31
Payer: COMMERCIAL

## 2023-03-31 DIAGNOSIS — I20.1 VASOSPASTIC ANGINA (HCC): ICD-10-CM

## 2023-03-31 LAB
ALBUMIN SERPL-MCNC: 4.4 G/DL (ref 3.4–5)
ALBUMIN/GLOB SERPL: 1.9 {RATIO} (ref 1.1–2.2)
ALP SERPL-CCNC: 76 U/L (ref 40–129)
ALT SERPL-CCNC: 12 U/L (ref 10–40)
ANION GAP SERPL CALCULATED.3IONS-SCNC: 12 MMOL/L (ref 3–16)
AST SERPL-CCNC: 15 U/L (ref 15–37)
BASOPHILS # BLD: 0 K/UL (ref 0–0.2)
BASOPHILS NFR BLD: 0.4 %
BILIRUB SERPL-MCNC: 0.4 MG/DL (ref 0–1)
BUN SERPL-MCNC: 9 MG/DL (ref 7–20)
CALCIUM SERPL-MCNC: 9.7 MG/DL (ref 8.3–10.6)
CHLORIDE SERPL-SCNC: 102 MMOL/L (ref 99–110)
CHOLEST SERPL-MCNC: 193 MG/DL (ref 0–199)
CO2 SERPL-SCNC: 26 MMOL/L (ref 21–32)
CREAT SERPL-MCNC: 0.8 MG/DL (ref 0.6–1.1)
DEPRECATED RDW RBC AUTO: 13.6 % (ref 12.4–15.4)
EOSINOPHIL # BLD: 0.1 K/UL (ref 0–0.6)
EOSINOPHIL NFR BLD: 2.5 %
GFR SERPLBLD CREATININE-BSD FMLA CKD-EPI: >60 ML/MIN/{1.73_M2}
GLUCOSE SERPL-MCNC: 89 MG/DL (ref 70–99)
HCT VFR BLD AUTO: 39 % (ref 36–48)
HDLC SERPL-MCNC: 63 MG/DL (ref 40–60)
HGB BLD-MCNC: 12.9 G/DL (ref 12–16)
LDL CHOLESTEROL CALCULATED: 114 MG/DL
LYMPHOCYTES # BLD: 1.4 K/UL (ref 1–5.1)
LYMPHOCYTES NFR BLD: 34.8 %
MCH RBC QN AUTO: 28.5 PG (ref 26–34)
MCHC RBC AUTO-ENTMCNC: 33.2 G/DL (ref 31–36)
MCV RBC AUTO: 85.8 FL (ref 80–100)
MONOCYTES # BLD: 0.4 K/UL (ref 0–1.3)
MONOCYTES NFR BLD: 9.9 %
NEUTROPHILS # BLD: 2.1 K/UL (ref 1.7–7.7)
NEUTROPHILS NFR BLD: 52.4 %
PLATELET # BLD AUTO: 185 K/UL (ref 135–450)
PMV BLD AUTO: 9.9 FL (ref 5–10.5)
POTASSIUM SERPL-SCNC: 4.8 MMOL/L (ref 3.5–5.1)
PROT SERPL-MCNC: 6.7 G/DL (ref 6.4–8.2)
RBC # BLD AUTO: 4.55 M/UL (ref 4–5.2)
SODIUM SERPL-SCNC: 140 MMOL/L (ref 136–145)
TRIGL SERPL-MCNC: 79 MG/DL (ref 0–150)
VLDLC SERPL CALC-MCNC: 16 MG/DL
WBC # BLD AUTO: 4.1 K/UL (ref 4–11)

## 2023-03-31 PROCEDURE — 36415 COLL VENOUS BLD VENIPUNCTURE: CPT

## 2023-03-31 NOTE — PROGRESS NOTES
Blood drawn per order. Needle size: 23 g  Site: L Antecubital.  First attempt successful Yes    Second attempt NA    Pressure applied until bleeding stopped. Cotton and bandage applied. Patient informed to call office or return if bleeding reoccurs and unable to stop.     Tubes drawn: 1 purple     1 red

## 2023-07-31 ENCOUNTER — HOSPITAL ENCOUNTER (OUTPATIENT)
Dept: MAMMOGRAPHY | Age: 43
Discharge: HOME | End: 2023-07-31
Payer: COMMERCIAL

## 2023-07-31 VITALS — HEIGHT: 64 IN | WEIGHT: 160 LBS | BODY MASS INDEX: 27.31 KG/M2

## 2023-07-31 DIAGNOSIS — Z12.39 SCREENING BREAST EXAMINATION: ICD-10-CM

## 2023-07-31 PROCEDURE — 77063 BREAST TOMOSYNTHESIS BI: CPT

## 2024-02-05 ENCOUNTER — TELEMEDICINE (OUTPATIENT)
Dept: FAMILY MEDICINE CLINIC | Age: 44
End: 2024-02-05
Payer: COMMERCIAL

## 2024-02-05 DIAGNOSIS — J02.9 SORE THROAT: Primary | ICD-10-CM

## 2024-02-05 DIAGNOSIS — Z20.818 STREP THROAT EXPOSURE: ICD-10-CM

## 2024-02-05 DIAGNOSIS — R09.81 SINUS CONGESTION: ICD-10-CM

## 2024-02-05 PROCEDURE — 1036F TOBACCO NON-USER: CPT

## 2024-02-05 PROCEDURE — G8484 FLU IMMUNIZE NO ADMIN: HCPCS

## 2024-02-05 PROCEDURE — G8427 DOCREV CUR MEDS BY ELIG CLIN: HCPCS

## 2024-02-05 PROCEDURE — 99213 OFFICE O/P EST LOW 20 MIN: CPT

## 2024-02-05 PROCEDURE — G8419 CALC BMI OUT NRM PARAM NOF/U: HCPCS

## 2024-02-05 RX ORDER — AMOXICILLIN 500 MG/1
500 CAPSULE ORAL 2 TIMES DAILY
Qty: 14 CAPSULE | Refills: 0 | Status: SHIPPED | OUTPATIENT
Start: 2024-02-05 | End: 2024-02-12

## 2024-02-05 ASSESSMENT — ENCOUNTER SYMPTOMS
RESPIRATORY NEGATIVE: 1
ALLERGIC/IMMUNOLOGIC NEGATIVE: 1
NAUSEA: 0
EYES NEGATIVE: 1
SINUS PRESSURE: 0
SORE THROAT: 1

## 2024-02-05 NOTE — PROGRESS NOTES
Kisha Becerril, was evaluated through a synchronous (real-time) audio-video encounter. The patient (or guardian if applicable) is aware that this is a billable service, which includes applicable co-pays. This Virtual Visit was conducted with patient's (and/or legal guardian's) consent. Patient identification was verified, and a caregiver was present when appropriate.   The patient was located at Home: [address] [place] [id number]  Provider was located at Facility (Appt Dept): [address] [place], [place] [id number]      Kisha Becerril (:  1980) is a Established patient, presenting virtually for evaluation of the following:    Assessment & Plan   Below is the assessment and plan developed based on review of pertinent history, physical exam, labs, studies, and medications.  1. Sore throat  -Likely secondary to strep throat.  Both children at home tested positive for strep  -Amoxicillin  -Okay to return to work when fever free  -     amoxicillin (AMOXIL) 500 MG capsule; Take 1 capsule by mouth 2 times daily for 7 days, Disp-14 capsule, R-0Normal  2. Strep throat exposure  -See above  -     amoxicillin (AMOXIL) 500 MG capsule; Take 1 capsule by mouth 2 times daily for 7 days, Disp-14 capsule, R-0Normal  3. Sinus congestion  -Over-the-counter measures    No follow-ups on file.       Subjective     Patient seen virtually today for chief complaint of sore throat that began Saturday.  Both children in the household tested positive for strep throat Thursday last week.  Patient reports fever as high as 100.4.  Painful swallowing and redness of the oropharynx.  Currently denies any other complaints    Pharyngitis  This is a new problem. The current episode started in the past 7 days (Saturday). The problem has been unchanged. Associated symptoms include chills, congestion, a fever (100.4) and a sore throat. Pertinent negatives include no chest pain, myalgias, nausea, numbness, urinary symptoms or

## 2024-07-30 ENCOUNTER — HOSPITAL ENCOUNTER (OUTPATIENT)
Dept: MAMMOGRAPHY | Age: 44
Discharge: HOME | End: 2024-07-30
Payer: COMMERCIAL

## 2024-07-30 DIAGNOSIS — Z12.39 SCREENING BREAST EXAMINATION: ICD-10-CM

## 2024-07-30 PROCEDURE — 77063 BREAST TOMOSYNTHESIS BI: CPT

## 2024-11-12 NOTE — TELEPHONE ENCOUNTER
Patient states she is having a lot of discomfort like squeezing in her chest on the left side,  She said it is not as bad as when she went to the ER the first time this happened. But the squeezing she is not having any issues breathing. I suggested she should go to the ER or to be see here but she wanted me to send a msg to [place] first.  I told her if the pain gets worse before we call her back then she should go to the ER She has a stress test on 4/15.   Please advise [General Appearance - Alert] : alert [General Appearance - In No Acute Distress] : in no acute distress [General Appearance - Well Nourished] : well nourished [General Appearance - Well Developed] : well developed [General Appearance - Well-Appearing] : healthy appearing [Sclera] : the sclera and conjunctiva were normal [PERRL With Normal Accommodation] : pupils were equal in size, round, and reactive to light [Extraocular Movements] : extraocular movements were intact [Outer Ear] : the ears and nose were normal in appearance [Neck Appearance] : the appearance of the neck was normal [Jugular Venous Distention Increased] : there was no jugular-venous distention [] : no respiratory distress [Respiration, Rhythm And Depth] : normal respiratory rhythm and effort [Exaggerated Use Of Accessory Muscles For Inspiration] : no accessory muscle use [Auscultation Breath Sounds / Voice Sounds] : lungs were clear to auscultation bilaterally [Heart Sounds] : normal S1 and S2 [Heart Sounds Pericardial Friction Rub] : no pericardial rub [Arterial Pulses Carotid] : carotid pulses were normal with no bruits [Edema] : there was no peripheral edema [Bowel Sounds] : normal bowel sounds [Abdomen Soft] : soft [Abdomen Tenderness] : non-tender [No CVA Tenderness] : no ~M costovertebral angle tenderness [Abnormal Walk] : normal gait [Skin Color & Pigmentation] : normal skin color and pigmentation [Skin Turgor] : normal skin turgor [Motor Exam] : the motor exam was normal [Oriented To Time, Place, And Person] : oriented to person, place, and time [Impaired Insight] : insight and judgment were intact [Affect] : the affect was normal [Mood] : the mood was normal

## 2024-12-15 ASSESSMENT — PATIENT HEALTH QUESTIONNAIRE - PHQ9
1. LITTLE INTEREST OR PLEASURE IN DOING THINGS: NOT AT ALL
2. FEELING DOWN, DEPRESSED OR HOPELESS: NOT AT ALL
1. LITTLE INTEREST OR PLEASURE IN DOING THINGS: NOT AT ALL
2. FEELING DOWN, DEPRESSED OR HOPELESS: NOT AT ALL
SUM OF ALL RESPONSES TO PHQ QUESTIONS 1-9: 0
SUM OF ALL RESPONSES TO PHQ9 QUESTIONS 1 & 2: 0
SUM OF ALL RESPONSES TO PHQ QUESTIONS 1-9: 0
SUM OF ALL RESPONSES TO PHQ9 QUESTIONS 1 & 2: 0

## 2024-12-18 ENCOUNTER — OFFICE VISIT (OUTPATIENT)
Dept: FAMILY MEDICINE CLINIC | Age: 44
End: 2024-12-18
Payer: COMMERCIAL

## 2024-12-18 VITALS
WEIGHT: 167.6 LBS | OXYGEN SATURATION: 97 % | SYSTOLIC BLOOD PRESSURE: 110 MMHG | DIASTOLIC BLOOD PRESSURE: 70 MMHG | BODY MASS INDEX: 28.77 KG/M2 | HEART RATE: 57 BPM

## 2024-12-18 DIAGNOSIS — Z00.00 ENCOUNTER FOR WELL ADULT EXAM WITHOUT ABNORMAL FINDINGS: Primary | ICD-10-CM

## 2024-12-18 DIAGNOSIS — I20.1 VASOSPASTIC ANGINA (HCC): ICD-10-CM

## 2024-12-18 LAB
ALBUMIN SERPL-MCNC: 4.5 G/DL (ref 3.4–5)
ALBUMIN/GLOB SERPL: 2.1 {RATIO} (ref 1.1–2.2)
ALP SERPL-CCNC: 80 U/L (ref 40–129)
ALT SERPL-CCNC: 21 U/L (ref 10–40)
ANION GAP SERPL CALCULATED.3IONS-SCNC: 9 MMOL/L (ref 3–16)
AST SERPL-CCNC: 26 U/L (ref 15–37)
BASOPHILS # BLD: 0 K/UL (ref 0–0.2)
BASOPHILS NFR BLD: 0.2 %
BILIRUB SERPL-MCNC: <0.2 MG/DL (ref 0–1)
BUN SERPL-MCNC: 9 MG/DL (ref 7–20)
CALCIUM SERPL-MCNC: 9.3 MG/DL (ref 8.3–10.6)
CHLORIDE SERPL-SCNC: 104 MMOL/L (ref 99–110)
CHOLEST SERPL-MCNC: 152 MG/DL (ref 0–199)
CO2 SERPL-SCNC: 26 MMOL/L (ref 21–32)
CREAT SERPL-MCNC: 0.7 MG/DL (ref 0.6–1.1)
DEPRECATED RDW RBC AUTO: 15.3 % (ref 12.4–15.4)
EOSINOPHIL # BLD: 0.1 K/UL (ref 0–0.6)
EOSINOPHIL NFR BLD: 2.2 %
EST. AVERAGE GLUCOSE BLD GHB EST-MCNC: 105.4 MG/DL
GFR SERPLBLD CREATININE-BSD FMLA CKD-EPI: >90 ML/MIN/{1.73_M2}
GLUCOSE SERPL-MCNC: 94 MG/DL (ref 70–99)
HBA1C MFR BLD: 5.3 %
HCT VFR BLD AUTO: 38.5 % (ref 36–48)
HDLC SERPL-MCNC: 58 MG/DL (ref 40–60)
HGB BLD-MCNC: 13 G/DL (ref 12–16)
LDL CHOLESTEROL: 83 MG/DL
LYMPHOCYTES # BLD: 1.3 K/UL (ref 1–5.1)
LYMPHOCYTES NFR BLD: 37.4 %
MCH RBC QN AUTO: 28.6 PG (ref 26–34)
MCHC RBC AUTO-ENTMCNC: 33.7 G/DL (ref 31–36)
MCV RBC AUTO: 85 FL (ref 80–100)
MONOCYTES # BLD: 0.4 K/UL (ref 0–1.3)
MONOCYTES NFR BLD: 10.9 %
NEUTROPHILS # BLD: 1.8 K/UL (ref 1.7–7.7)
NEUTROPHILS NFR BLD: 49.3 %
PLATELET # BLD AUTO: 166 K/UL (ref 135–450)
PMV BLD AUTO: 10.1 FL (ref 5–10.5)
POTASSIUM SERPL-SCNC: 4.4 MMOL/L (ref 3.5–5.1)
PROT SERPL-MCNC: 6.6 G/DL (ref 6.4–8.2)
RBC # BLD AUTO: 4.53 M/UL (ref 4–5.2)
SODIUM SERPL-SCNC: 139 MMOL/L (ref 136–145)
TRIGL SERPL-MCNC: 55 MG/DL (ref 0–150)
VLDLC SERPL CALC-MCNC: 11 MG/DL
WBC # BLD AUTO: 3.6 K/UL (ref 4–11)

## 2024-12-18 PROCEDURE — 36415 COLL VENOUS BLD VENIPUNCTURE: CPT

## 2024-12-18 PROCEDURE — 99396 PREV VISIT EST AGE 40-64: CPT

## 2024-12-18 PROCEDURE — G8484 FLU IMMUNIZE NO ADMIN: HCPCS

## 2024-12-18 RX ORDER — PRAVASTATIN SODIUM 20 MG
20 TABLET ORAL DAILY
COMMUNITY
Start: 2024-10-22

## 2024-12-18 ASSESSMENT — ENCOUNTER SYMPTOMS
CHEST TIGHTNESS: 0
COLOR CHANGE: 0
SORE THROAT: 0
EYE PAIN: 0
CONSTIPATION: 0
DIARRHEA: 0
COUGH: 0
SHORTNESS OF BREATH: 0
ABDOMINAL PAIN: 0
ABDOMINAL DISTENTION: 0
EYE DISCHARGE: 0

## 2024-12-18 NOTE — PROGRESS NOTES
membrane and external ear normal.      Nose: No congestion or rhinorrhea.      Mouth/Throat:      Mouth: Mucous membranes are moist.      Pharynx: Oropharynx is clear. No posterior oropharyngeal erythema.   Eyes:      Extraocular Movements: Extraocular movements intact.      Conjunctiva/sclera: Conjunctivae normal.      Pupils: Pupils are equal, round, and reactive to light.   Cardiovascular:      Rate and Rhythm: Normal rate and regular rhythm.      Pulses: Normal pulses.      Heart sounds: Normal heart sounds. No murmur heard.  Pulmonary:      Effort: Pulmonary effort is normal. No respiratory distress.      Breath sounds: Normal breath sounds. No wheezing.   Abdominal:      General: Abdomen is flat. Bowel sounds are normal.      Palpations: Abdomen is soft.      Tenderness: There is no abdominal tenderness.      Hernia: No hernia is present.   Musculoskeletal:         General: No swelling. Normal range of motion.      Cervical back: Normal range of motion and neck supple. No tenderness.      Right lower leg: No edema.      Left lower leg: No edema.   Lymphadenopathy:      Cervical: No cervical adenopathy.   Skin:     General: Skin is warm and dry.      Capillary Refill: Capillary refill takes less than 2 seconds.   Neurological:      General: No focal deficit present.      Mental Status: She is alert and oriented to person, place, and time. Mental status is at baseline.      Cranial Nerves: No cranial nerve deficit.   Psychiatric:         Mood and Affect: Mood normal.         Behavior: Behavior normal.         Judgment: Judgment normal.             Personalized Preventive Plan  Current Health Maintenance Status  Immunization History   Administered Date(s) Administered    COVID-19, PFIZER PURPLE top, DILUTE for use, (age 12 y+), 30mcg/0.3mL 09/18/2021, 10/09/2021        Health Maintenance Due   Topic Date Due    Hepatitis B vaccine (1 of 3 - 19+ 3-dose series) Never done    DTaP/Tdap/Td vaccine (1 - Tdap) Never

## 2024-12-19 NOTE — RESULT ENCOUNTER NOTE
CBC grossly normal.  A1c is 5.3.  No diabetes.  Cholesterol panel overall looks good.  Kidney function panel normal

## 2025-04-25 ENCOUNTER — PATIENT MESSAGE (OUTPATIENT)
Dept: FAMILY MEDICINE CLINIC | Age: 45
End: 2025-04-25

## 2025-07-10 ENCOUNTER — HOSPITAL ENCOUNTER (EMERGENCY)
Age: 45
Discharge: HOME | End: 2025-07-10
Attending: STUDENT IN AN ORGANIZED HEALTH CARE EDUCATION/TRAINING PROGRAM
Payer: COMMERCIAL

## 2025-07-10 VITALS
SYSTOLIC BLOOD PRESSURE: 128 MMHG | OXYGEN SATURATION: 98 % | HEIGHT: 64 IN | DIASTOLIC BLOOD PRESSURE: 79 MMHG | BODY MASS INDEX: 28.68 KG/M2 | RESPIRATION RATE: 18 BRPM | HEART RATE: 62 BPM | TEMPERATURE: 97.8 F | WEIGHT: 168 LBS

## 2025-07-10 DIAGNOSIS — T63.481A INSECT STINGS, ACCIDENTAL OR UNINTENTIONAL, INITIAL ENCOUNTER: Primary | ICD-10-CM

## 2025-07-10 PROCEDURE — 6370000000 HC RX 637 (ALT 250 FOR IP): Performed by: STUDENT IN AN ORGANIZED HEALTH CARE EDUCATION/TRAINING PROGRAM

## 2025-07-10 PROCEDURE — 99284 EMERGENCY DEPT VISIT MOD MDM: CPT

## 2025-07-10 RX ORDER — CETIRIZINE HYDROCHLORIDE 10 MG/1
10 TABLET ORAL ONCE
Status: COMPLETED | OUTPATIENT
Start: 2025-07-10 | End: 2025-07-10

## 2025-07-10 RX ORDER — PREDNISONE 50 MG/1
50 TABLET ORAL DAILY
Qty: 5 TABLET | Refills: 0 | Status: SHIPPED | OUTPATIENT
Start: 2025-07-10 | End: 2025-07-15

## 2025-07-10 RX ORDER — EPINEPHRINE 0.3 MG/.3ML
0.3 INJECTION SUBCUTANEOUS ONCE
Qty: 0.3 ML | Refills: 0 | Status: SHIPPED | OUTPATIENT
Start: 2025-07-10 | End: 2025-07-10

## 2025-07-10 RX ORDER — FAMOTIDINE 20 MG/1
20 TABLET, FILM COATED ORAL ONCE
Status: COMPLETED | OUTPATIENT
Start: 2025-07-10 | End: 2025-07-10

## 2025-07-10 RX ORDER — FAMOTIDINE 20 MG/1
20 TABLET, FILM COATED ORAL 2 TIMES DAILY
Qty: 10 TABLET | Refills: 0 | Status: SHIPPED | OUTPATIENT
Start: 2025-07-10 | End: 2025-07-15

## 2025-07-10 RX ADMIN — FAMOTIDINE 20 MG: 20 TABLET, FILM COATED ORAL at 16:04

## 2025-07-10 RX ADMIN — CETIRIZINE HYDROCHLORIDE 10 MG: 10 TABLET ORAL at 16:04

## 2025-07-10 RX ADMIN — PREDNISONE 50 MG: 20 TABLET ORAL at 16:04

## 2025-07-10 ASSESSMENT — LIFESTYLE VARIABLES
HOW OFTEN DO YOU HAVE A DRINK CONTAINING ALCOHOL: NEVER
HOW MANY STANDARD DRINKS CONTAINING ALCOHOL DO YOU HAVE ON A TYPICAL DAY: PATIENT DOES NOT DRINK

## 2025-07-10 ASSESSMENT — PAIN - FUNCTIONAL ASSESSMENT: PAIN_FUNCTIONAL_ASSESSMENT: 0-10

## 2025-07-10 ASSESSMENT — PAIN DESCRIPTION - LOCATION: LOCATION: NECK;THROAT;EAR

## 2025-07-10 ASSESSMENT — PAIN SCALES - GENERAL: PAINLEVEL_OUTOF10: 7

## 2025-07-10 ASSESSMENT — PAIN DESCRIPTION - ONSET: ONSET: SUDDEN

## 2025-07-10 ASSESSMENT — PAIN DESCRIPTION - FREQUENCY: FREQUENCY: CONTINUOUS

## 2025-07-10 ASSESSMENT — PAIN DESCRIPTION - PAIN TYPE: TYPE: ACUTE PAIN

## 2025-07-10 ASSESSMENT — PAIN DESCRIPTION - ORIENTATION: ORIENTATION: RIGHT

## 2025-07-10 ASSESSMENT — PAIN DESCRIPTION - DESCRIPTORS: DESCRIPTORS: DISCOMFORT

## 2025-07-10 NOTE — ED NOTES
Pt stated that it is easier to swallow at this time and pt stated that her neck and ear feel a little better at this time. No further needs at this time.

## 2025-07-10 NOTE — DISCHARGE INSTRUCTIONS
Return to the nearest ED if you develop difficulty breathing, nausea and vomiting, or other concerning symptoms.  If you have difficulty breathing, you should use the EpiPen immediately and then return.

## 2025-07-10 NOTE — ED PROVIDER NOTES
of education: Not on file    Highest education level: Not on file   Occupational History    Not on file   Tobacco Use    Smoking status: Never    Smokeless tobacco: Never   Substance and Sexual Activity    Alcohol use: No    Drug use: No    Sexual activity: Yes     Partners: Male   Other Topics Concern    Not on file   Social History Narrative    Not on file     Social Drivers of Health     Financial Resource Strain: Not on file   Food Insecurity: Not on file (12/16/2023)   Transportation Needs: Not on file   Physical Activity: Not on file   Stress: Not on file   Social Connections: Not on file   Intimate Partner Violence: Not on file   Housing Stability: Not on file     No current facility-administered medications for this encounter.     Current Outpatient Medications   Medication Sig Dispense Refill    famotidine (PEPCID) 20 MG tablet Take 1 tablet by mouth 2 times daily for 5 days 10 tablet 0    predniSONE (DELTASONE) 50 MG tablet Take 1 tablet by mouth daily for 5 days 5 tablet 0    EPINEPHrine (EPIPEN 2-LATRICIA) 0.3 MG/0.3ML SOAJ injection Inject 0.3 mLs into the muscle once for 1 dose Use as directed for allergic reaction 0.3 mL 0    pravastatin (PRAVACHOL) 20 MG tablet Take 1 tablet by mouth daily      amLODIPine (NORVASC) 2.5 MG tablet Take 1 tablet by mouth daily 90 tablet 3    lisinopril (PRINIVIL;ZESTRIL) 2.5 MG tablet Take 1 tablet by mouth daily      aspirin 81 MG chewable tablet Take 1 tablet by mouth daily       No Known Allergies    REVIEW OF SYSTEMS  10 systems reviewed, pertinent positives per HPI otherwise noted to be negative.    PHYSICAL EXAM  BP (!) 147/70   Pulse 66   Temp 97.8 °F (36.6 °C) (Oral)   Resp 18   Ht 1.626 m (5' 4\")   Wt 76.2 kg (168 lb)   SpO2 100%   BMI 28.84 kg/m²    General: Appears well.  Alert  HEENT: Head atraumatic, Eyes normal inspection, PERRL.  Mild erythema of the face over the right mandible, no induration, Pharynx normal. No signs of dehydration  NECK: Erythema

## 2025-07-31 ENCOUNTER — HOSPITAL ENCOUNTER (OUTPATIENT)
Dept: MAMMOGRAPHY | Age: 45
Discharge: HOME | End: 2025-07-31
Payer: COMMERCIAL

## 2025-07-31 VITALS — WEIGHT: 168 LBS | HEIGHT: 64 IN | BODY MASS INDEX: 28.68 KG/M2

## 2025-07-31 DIAGNOSIS — Z12.31 SCREENING MAMMOGRAM, ENCOUNTER FOR: ICD-10-CM

## 2025-07-31 PROCEDURE — 77063 BREAST TOMOSYNTHESIS BI: CPT
